# Patient Record
Sex: MALE | Race: WHITE | NOT HISPANIC OR LATINO | Employment: FULL TIME | URBAN - METROPOLITAN AREA
[De-identification: names, ages, dates, MRNs, and addresses within clinical notes are randomized per-mention and may not be internally consistent; named-entity substitution may affect disease eponyms.]

---

## 2017-01-27 ENCOUNTER — HOSPITAL ENCOUNTER (EMERGENCY)
Facility: CLINIC | Age: 47
Discharge: ANOTHER HOSPITAL | End: 2017-01-28
Attending: EMERGENCY MEDICINE | Admitting: EMERGENCY MEDICINE
Payer: COMMERCIAL

## 2017-01-27 ENCOUNTER — TRANSFERRED RECORDS (OUTPATIENT)
Dept: HEALTH INFORMATION MANAGEMENT | Facility: CLINIC | Age: 47
End: 2017-01-27

## 2017-01-27 DIAGNOSIS — I21.4 NSTEMI (NON-ST ELEVATED MYOCARDIAL INFARCTION) (H): ICD-10-CM

## 2017-01-27 LAB
BASOPHILS # BLD AUTO: 0 10E9/L (ref 0–0.2)
BASOPHILS NFR BLD AUTO: 0.3 %
DIFFERENTIAL METHOD BLD: ABNORMAL
EOSINOPHIL # BLD AUTO: 0.1 10E9/L (ref 0–0.7)
EOSINOPHIL NFR BLD AUTO: 0.6 %
ERYTHROCYTE [DISTWIDTH] IN BLOOD BY AUTOMATED COUNT: 13.2 % (ref 10–15)
HCT VFR BLD AUTO: 51.8 % (ref 40–53)
HGB BLD-MCNC: 17.2 G/DL (ref 13.3–17.7)
IMM GRANULOCYTES # BLD: 0 10E9/L (ref 0–0.4)
IMM GRANULOCYTES NFR BLD: 0.3 %
LYMPHOCYTES # BLD AUTO: 2.2 10E9/L (ref 0.8–5.3)
LYMPHOCYTES NFR BLD AUTO: 18.7 %
MCH RBC QN AUTO: 26.9 PG (ref 26.5–33)
MCHC RBC AUTO-ENTMCNC: 33.2 G/DL (ref 31.5–36.5)
MCV RBC AUTO: 81 FL (ref 78–100)
MONOCYTES # BLD AUTO: 1.3 10E9/L (ref 0–1.3)
MONOCYTES NFR BLD AUTO: 10.7 %
NEUTROPHILS # BLD AUTO: 8.3 10E9/L (ref 1.6–8.3)
NEUTROPHILS NFR BLD AUTO: 69.4 %
NRBC # BLD AUTO: 0 10*3/UL
NRBC BLD AUTO-RTO: 0 /100
PLATELET # BLD AUTO: 256 10E9/L (ref 150–450)
RBC # BLD AUTO: 6.39 10E12/L (ref 4.4–5.9)
WBC # BLD AUTO: 11.9 10E9/L (ref 4–11)

## 2017-01-27 PROCEDURE — 96365 THER/PROPH/DIAG IV INF INIT: CPT

## 2017-01-27 PROCEDURE — 96366 THER/PROPH/DIAG IV INF ADDON: CPT

## 2017-01-27 PROCEDURE — 84484 ASSAY OF TROPONIN QUANT: CPT | Performed by: EMERGENCY MEDICINE

## 2017-01-27 PROCEDURE — 99285 EMERGENCY DEPT VISIT HI MDM: CPT | Mod: 25

## 2017-01-27 PROCEDURE — 85025 COMPLETE CBC W/AUTO DIFF WBC: CPT | Performed by: EMERGENCY MEDICINE

## 2017-01-27 PROCEDURE — 96368 THER/DIAG CONCURRENT INF: CPT

## 2017-01-27 PROCEDURE — 93005 ELECTROCARDIOGRAM TRACING: CPT

## 2017-01-27 PROCEDURE — 25000128 H RX IP 250 OP 636: Performed by: EMERGENCY MEDICINE

## 2017-01-27 PROCEDURE — 96375 TX/PRO/DX INJ NEW DRUG ADDON: CPT

## 2017-01-27 PROCEDURE — 80048 BASIC METABOLIC PNL TOTAL CA: CPT | Performed by: EMERGENCY MEDICINE

## 2017-01-27 PROCEDURE — 25000128 H RX IP 250 OP 636

## 2017-01-27 RX ORDER — LIDOCAINE 40 MG/G
CREAM TOPICAL
Status: DISCONTINUED | OUTPATIENT
Start: 2017-01-27 | End: 2017-01-28 | Stop reason: HOSPADM

## 2017-01-27 RX ORDER — MORPHINE SULFATE 4 MG/ML
4 INJECTION, SOLUTION INTRAMUSCULAR; INTRAVENOUS
Status: DISCONTINUED | OUTPATIENT
Start: 2017-01-27 | End: 2017-01-28 | Stop reason: HOSPADM

## 2017-01-27 RX ORDER — NITROGLYCERIN 20 MG/100ML
.07-2 INJECTION INTRAVENOUS CONTINUOUS
Status: DISCONTINUED | OUTPATIENT
Start: 2017-01-27 | End: 2017-01-28 | Stop reason: HOSPADM

## 2017-01-27 RX ORDER — NITROGLYCERIN 20 MG/100ML
INJECTION INTRAVENOUS
Status: COMPLETED
Start: 2017-01-27 | End: 2017-01-27

## 2017-01-27 RX ADMIN — NITROGLYCERIN 0.07 MCG/KG/MIN: 20 INJECTION INTRAVENOUS at 23:45

## 2017-01-27 RX ADMIN — NITROGLYCERIN 0.17 MCG/KG/MIN: 20 INJECTION INTRAVENOUS at 23:50

## 2017-01-27 RX ADMIN — SODIUM CHLORIDE 1000 ML: 9 INJECTION, SOLUTION INTRAVENOUS at 23:45

## 2017-01-27 RX ADMIN — MORPHINE SULFATE 4 MG: 4 INJECTION, SOLUTION INTRAMUSCULAR; INTRAVENOUS at 23:49

## 2017-01-28 ENCOUNTER — APPOINTMENT (OUTPATIENT)
Dept: GENERAL RADIOLOGY | Facility: CLINIC | Age: 47
End: 2017-01-28
Attending: EMERGENCY MEDICINE
Payer: COMMERCIAL

## 2017-01-28 ENCOUNTER — TRANSFERRED RECORDS (OUTPATIENT)
Dept: HEALTH INFORMATION MANAGEMENT | Facility: CLINIC | Age: 47
End: 2017-01-28

## 2017-01-28 VITALS
DIASTOLIC BLOOD PRESSURE: 84 MMHG | SYSTOLIC BLOOD PRESSURE: 141 MMHG | HEIGHT: 72 IN | RESPIRATION RATE: 18 BRPM | BODY MASS INDEX: 30.16 KG/M2 | TEMPERATURE: 98.2 F | WEIGHT: 222.66 LBS | HEART RATE: 87 BPM | OXYGEN SATURATION: 95 %

## 2017-01-28 LAB
ANION GAP SERPL CALCULATED.3IONS-SCNC: 7 MMOL/L (ref 3–14)
BUN SERPL-MCNC: 15 MG/DL (ref 7–30)
CALCIUM SERPL-MCNC: 8.2 MG/DL (ref 8.5–10.1)
CHLORIDE SERPL-SCNC: 109 MMOL/L (ref 94–109)
CO2 SERPL-SCNC: 25 MMOL/L (ref 20–32)
CREAT SERPL-MCNC: 0.78 MG/DL (ref 0.66–1.25)
GFR SERPL CREATININE-BSD FRML MDRD: ABNORMAL ML/MIN/1.7M2
GLUCOSE SERPL-MCNC: 90 MG/DL (ref 70–99)
POTASSIUM SERPL-SCNC: 3.6 MMOL/L (ref 3.4–5.3)
SODIUM SERPL-SCNC: 141 MMOL/L (ref 133–144)
TROPONIN I SERPL-MCNC: 12.03 UG/L (ref 0–0.04)

## 2017-01-28 PROCEDURE — 25000128 H RX IP 250 OP 636: Performed by: EMERGENCY MEDICINE

## 2017-01-28 PROCEDURE — 71020 XR CHEST 2 VW: CPT

## 2017-01-28 RX ADMIN — Medication 5200 UNITS: at 00:36

## 2017-01-28 RX ADMIN — NITROGLYCERIN 0.27 MCG/KG/MIN: 20 INJECTION INTRAVENOUS at 00:21

## 2017-01-28 NOTE — ED NOTES
Chest pain started this am mid sternal and to the left this am pain 6/10   Now rates pain  4/10  Tightness   Denies sob, no n/v  Worked all day    Asa given and nitro  Given at clinic   Decided to go tonight d/t sister insisting

## 2017-01-28 NOTE — ED NOTES
Pt referred to ED from [facility] for chest pain and elevated troponin.  Chest pain has been present all day today.  Pt received ASA and Nitro at [facility].

## 2017-01-28 NOTE — ED PROVIDER NOTES
History     Chief Complaint:  Chest pain     HPI   Roberto Albright is a 46 year old male smoker with a past medical history of untreated hypertension and hyperlipidemia who presents for evaluation of chest pain. The patient states that he woke up this morning with a substernal, left-sided chest pain with no radiation to his arms or neck. He was seen at [facility] prior to arrival to the ED where he was given 1 nitroglycerin tablet and an aspirin upon finding the patient's troponin to be 6.42 (other lab results below). The patient states that his chest pain has improved significantly with the above interventions. He denies any previous history of chest pain.     [facility] Laboratory Studies:  CBC:  WBC 10.7 (H), HGB 17.2 (H),   CMP: Glucose 109 (H),  (H), otherwise WNL (Creatinine 0.9)    (2207) CK-MB: 80.1 (H)  (2207) Troponin I: 6.42 (H)    (2207) BNP: 84    (2207) D-Dimer: 101    Cardiac Risk Factors   Sex: Male   Tobacco: Positive  Hypertension: Positive  Diabetes: Negative  Hyperlipidemia: Positive  Family History: Negative    Allergies:  No known drug allergies.     Medications:    The patient is currently on no regular medications.     Past Medical History:    Hypertension  Hyperlipidemia    Past Surgical History:    Left inguinal hernia repair    Family History:    History reviewed. No pertinent family history.    Social History:  Marital Status:   Presents to the ED with his wife  Tobacco Use: yes  Alcohol Use: no  PCP: [facility]     Review of Systems   Eyes: Negative for visual disturbance.   Cardiovascular: Positive for chest pain (Improved).   All other systems reviewed and are negative.      Physical Exam     Patient Vitals for the past 24 hrs:   BP Temp Temp src Pulse Heart Rate Resp SpO2 Height Weight   01/28/17 0400 141/84 mmHg - - - 65 - 95 % - -   01/28/17 0345 121/80 mmHg - - - - - - - -   01/28/17 0330 125/80 mmHg - - - 63 -  94 % - -   01/28/17 0315 (!) 139/97 mmHg - - - 94 - 96 % - -   01/28/17 0300 (!) 145/94 mmHg - - - 68 - 94 % - -   01/28/17 0245 (!) 137/91 mmHg - - - 58 - 92 % - -   01/28/17 0230 131/88 mmHg - - - - - - - -   01/28/17 0215 (!) 136/92 mmHg - - - 67 - 95 % - -   01/28/17 0200 140/84 mmHg - - - 67 - 95 % - -   01/28/17 0130 134/89 mmHg - - - 61 - 96 % - -   01/28/17 0115 146/89 mmHg - - - - - - - -   01/28/17 0100 (!) 141/102 mmHg - - - 67 - 97 % - -   01/28/17 0045 137/88 mmHg - - - - - - - -   01/28/17 0030 (!) 142/101 mmHg - - - 71 - 95 % - -   01/28/17 0027 - - - - - - - - 101 kg (222 lb 10.6 oz)   01/27/17 2329 (!) 154/111 mmHg 98.2  F (36.8  C) Temporal 87 - 18 97 % 1.829 m (6') 101.4 kg (223 lb 8.7 oz)       Physical Exam   Nursing note and vitals reviewed.    Constitutional: Cooperative.   HENT:   Mouth/Throat: Mucous membranes are normal.   No JVD  Cardiovascular: Normal rate, regular rhythm and normal heart sounds.  No murmur.  Pulmonary/Chest: Effort normal and breath sounds normal. No respiratory distress. No wheezes. No rales.   Abdominal: Soft. Normal appearance and bowel sounds are normal. No distension. There is no tenderness. There is no rigidity and no guarding.   Musculoskeletal: No LE edema  Neurological: Alert. Oriented x4  Skin: Skin is warm and dry.   Psychiatric: Normal mood and affect.       Emergency Department Course   ECG:  @ 2324  Indication: Chest pain  Vent. Rate 69 bpm. AZ interval 170 ms. QRS duration 106 ms. QT/QTc 414/443 ms. P-R-T axis 52 -10 7.   Normal sinus rhythm with sinus arrhythmia. Possible inferior infarct, age undetermined. T wave abnormality, consider lateral ischemia.    Read @ 3740 by Dr. Sanchez.    Imaging:    XR Chest  Minimal prominence of interstitial markings in lower lungs may be due to some scattered fibrosis or linear atelectasis. No consolidative infiltrates. Heart size near the upper limits of normal. No pneumothorax or other acute findings.  Report per  radiology.    Radiographic findings were communicated with the patient who voiced understanding of the findings.    Laboratory:  CBC:  WBC 11.9 (L), HGB 17.2,   BMP: Calcium 8.2 (L), otherwise WNL (Creatinine 0.78)    (2335) Troponin I: 12.026 (H)    Interventions:  (2345) Normal Saline, 1 liter, IV bolus  (2349) Morphine, 4 mg, IV  (0021) Nitroglyccerin, 50 mg in D5W 250 mls, IV infusion  (0036) Heparin loading dose, 5,200 units, IV  (0036) Heparin infusion, 25,000 units in 0.45% NaCl 250 mls, IV    ASA given at outside clinic prior to arrival.     Emergency Department Course:  Nursing notes and vitals reviewed.  I performed an exam of the patient as documented above.     A peripheral IV was established. Blood was drawn from the patient. This was sent for laboratory testing, findings above.     The patient was sent for a chest x-ray while in the emergency department, findings above.     (2345) I consulted with Dr. Mcintyre of Cardiology regarding the patient.    (0015) I updated the patient on the plans for admission.    (0110)  Dr. Ramsey of the hospitalist service saw the patient in the ED. After discussion, she prefers to have the patient transferred instead of admitted here.     (0120) The patient's family requested that he be transferred to [facility].     Findings and plan explained to the patient. Patient will be transferred to [facility] via EMS.  (0208) I discussed the case with Dr. Pina, who will admit the patient to a monitored bed for further monitoring, evaluation, and treatment.      Impression & Plan      Medical Decision Making:  Roberto Albright is a 46 year old male who presents with chest pain. It has been present throughout most of the day. He had an elevated troponin at an outside hospital and was given an aspirin and nitro with improvement in his chest pain. Given he was still having pain when he arrived, he was placed on nitroglycerin. After confirming his mediastinum was normal with  no widening or concern clinically for dissection, I started him on heparin. EKG obtained here shows hyperacute T waves throughout he precordium, but no criteria for ST elevation myocardial infarction. I did confirm with cardiology that he is not a candidate for emergent cath lab activation. Will treat him medically. At this point, he is pain free and will be admitted to telemetry in stable condition.      Addendum: The patient was evaluated by our hospitalist, Dr. Ramsey. Given his almost certain need for catheterization tomorrow, she has requested that we proceed with transfer so that he is at a facility with capabilities of cath lab in case he would decompensate while being medically managed for his NSTEMI. There are no beds available at [facility] and at this time family has requested that he be transferred to [facility]. I have discussed the case with the hospitalist there and he'll be transferred for admission to the telemetry unit in stable condition.        Diagnosis:    ICD-10-CM   1. NSTEMI (non-ST elevated myocardial infarction) (H) I21.4       Disposition:  Transferred to [facility] under the care of Dr. Silvana FULTON, Ghulam Brewster, am serving as a scribe on 1/27/2017 at 11:50 PM to personally document services performed by Dr. Laura MD based on my observations and the provider's statements to me.     1/27/2017   [facility] EMERGENCY DEPARTMENT        Rogerio Sanchez MD  01/28/17 0453

## 2017-01-29 LAB — INTERPRETATION ECG - MUSE: NORMAL

## 2017-02-08 ENCOUNTER — TRANSFERRED RECORDS (OUTPATIENT)
Dept: HEALTH INFORMATION MANAGEMENT | Facility: CLINIC | Age: 47
End: 2017-02-08

## 2017-02-15 ENCOUNTER — HOSPITAL ENCOUNTER (OUTPATIENT)
Dept: CARDIAC REHAB | Facility: CLINIC | Age: 47
End: 2017-02-15
Payer: COMMERCIAL

## 2017-02-15 VITALS — HEIGHT: 72 IN | BODY MASS INDEX: 28.36 KG/M2 | WEIGHT: 209.4 LBS

## 2017-02-15 PROCEDURE — 93797 PHYS/QHP OP CAR RHAB WO ECG: CPT

## 2017-02-15 PROCEDURE — 40000116 ZZH STATISTIC OP CR VISIT

## 2017-02-15 PROCEDURE — 93798 PHYS/QHP OP CAR RHAB W/ECG: CPT

## 2017-02-15 PROCEDURE — 40000575 ZZH STATISTIC OP CARDIAC VISIT #2

## 2017-02-15 ASSESSMENT — 6 MINUTE WALK TEST (6MWT)
PREDICTED: 2200.84
TOTAL DISTANCE WALKED (FT): 1538
MALE CALC: 2187.5
FEMALE CALC: 1828.5

## 2017-02-15 NOTE — PROGRESS NOTES
Roberto Albright  1970  46 year old   02/15/17 0900   .I have established, reviewed and made necessary changes to the individualized treatment plan and exercise prescription for this patient.    Physician Name (printed): ________________________   Date: _______  Time: ______    Physician Signature: ___________________________________________    Session   Session Initial Evaluation and Exercise Prescription   Certified through this date 03/16/17   Cardiac Rehab Assessment   Cardiac Rehab Assessment 2/15 Pt presents for cardiac rehab initial evaluation s/p CABGx4 and NSTEMI. Pt has preserved heart function and has been feeling well post surgery. No issues with appetite or sternal clicking. He has been walking 2 miles (30-45 minutes) everyday since hospital discharge without symptoms. He is starting to wean off of pain medications. His surgery followup is scheduled 2-21-17 and he is hoping to get back to driving. He works as a  and most likely will not be returning to work until at least 3 months post surgery. Pt is motivated to keep up healthy lifestyle changes and will start cardiac rehab for monitored exercise, safe progression of exercise, and education surrounding medications and diet.   .The patient's history and clinical status including hemodynamics and ECG were evaluated.  The patient was assessed to be stable and appropriate to begin exercise.   The patient's functional capacity and exercise prescription were determined by the completion of the 6 minute walk test.  See results below.  The patient was oriented to the program.  Risk factor profile was completed. Goals and objectives were discussed. CV response was WNL. No symptoms, complaints or pain were reported. Good prognosis for reaching above goals. Skilled therapy is necessary in order to monitor CV response to exercise, to provide education on risk factors and behavior change counseling needed to achieve patient's goals.  Plan to complete  30-40 minutes of exercise prior to discharge from cardiac rehab.  Initial THR of 20-30 beats above RHR; Effort rating of 4-6.  Initiate muscle conditioning as appropriate and start surgical stretches next session.  Provide risk factor education and behavior change counseling.      General Information   Treatment Diagnosis Coronary Artery Bypass Surgery   Date of Treatment Diagnosis 01/30/17   Secondary Treatment Diagnosis NSTEMI   Significant Past CV History None   Comorbidities None   Other Medical History None noted   Lead up symptoms Chest pain awoke him in the night   Hospital Location [facility]   Hospital Discharge Date 02/03/17   Signs and Symptoms Post Hospital Discharge None   Outpatient Cardiac Rehab Start Date 02/15/17   Primary Physician Dr. Hernández   Primary Physician Follow Up Advised to schedule appointment   Surgeon Dr. Prince   Surgeon Follow Up Scheduled  (2/21/17)   Cardiologist see comments  ([facility], Pt unsure of name at this time)   Cardiologist Follow Up Other  (Pt has followup with surgeon next week)   Ejection Fraction Preserved   Risk Stratification Low   Summary of Cath Report   Summary of Cath Report Available   Date Performed 01/28/17   Left Main 30%   LAD 70%  (graft)   D1 50-95%  (graft)   LCX 50%  (graft)   OM 70%  (graft)   RCA 40-90%   PDA 70-90%   Living and Work Status    [facility]   Support System Live with an adult   Return to Employment Yes   Occupation    Preventative Medications   CMS recommended medications Lipid Lowering;Beta Blocker;Antiplatelets   Falls Screen   Have you fallen two or more times in the past year? No   Have you fallen and had an injury in the past year? No   Referral Initiated to Physical Therapy No   Pain   Patient Currently in Pain Denies   Physical Assessments   Incisions WNL   Edema None   Right Lung Sounds normal   Left Lung Sounds normal   Limitations No limitations   Individualized  Treatment Plan   Monitored Sessions Scheduled 24   Monitored Sessions Attended 1   Oxygen   Supplemental Oxygen needed No   Nutrition Management - Weight Management   Assessment Initial Assessment   Age 46   Weight 95 kg (209 lb 6.4 oz)   Height 1.829 m (6')   BMI (Calculated) 28.46   Goal Weight 90.7 kg (200 lb)   Initial Rate Your Plate Score. Dietary tool to assess eating patterns. Scores range from 24 to 72. The higher the score the healthier the eating pattern. 35   Weight Management Comments 2/15 Pt lost 18# while in the hospital. He states his goal weight is 200#. See nutrition section for details.    Nutrition Management - Lipids   Lipids Labs Not Available   Prescribed Lipid Medication Yes   Statin Intensity High Intensity   Lipid Comments 2/15 Pt is now taking atorvatstatin   Nutrition Management - Diabetes   Diabetes No   Nutrition Management Summary   Dietary Recommendations Mediterranean   Stages of Change for Diet Compliance Action   Interventions Planned Attend Nutrition Education Class(es);Instruct on Label Reading;Educate on Benefits of Exercise   Nutrition Summary Comments 2/15 Pt states he did not have a dietary consult while in the hospital so he would benefit from attending nutrition classes offerred through [facility] to learn more about label reading and heart healthy diet recommendations. He previously at out for all meals at the bar, gas stations, or other fast food places. Pt does not cook. He has already started making many diet changes and is eating more at home. Pt will benefit from learning about meal prep for when he returns to work as a .    Nutrition Target Outcome Weight loss .5-1 lb/week (if BMI > 25)   Psychosocial Management   Psychosocial Assessment Initial   Is there history of clinical depression or increased risk of depression? No previous history   Current Level of Stress per Patient Report Denies   Current Coping Skills Has Positive Support System   Initial Patient  Health Questionnaire -9 Score (PHQ-9) for depression. 5-9 Minimal symptoms, 10-14 Minor depression, 15-19 Major depression, moderately severe, > 20 Major depression, severe  2   Initial [facility] Survey score.  Quality of Life:   If total score > 25 review individual areas where patient rated a 4 or 5.  Consider patients current medical condition and what role that plays on the score.   Adjust treatment protocol to improve areas of concern.  Consider the following:  PHQ9 score, DASI, and re-assessment within the next 30 days to assist with developing treatments.  15   Stages of Change Maintenance   Interventions Planned Other (see comments)  (none needed at this time)   Psychosocial Comments 2/15 No risk for depression. Pt has positive support system and positive outlook.    Other Core Components - Hypertension   History of or Diagnosis of Hypertension No   Currently taking Anti-Hypertensives Yes   Other Core Components - Tobacco   History of Tobacco Use Yes   Quit Date or Planned Quit Date 01/27/17   Tobacco Use Status Recent (Quit < 6 mo ago)   Tobacco Habit Cigarettes   Tobacco Use per Day (average) 2ppd   Years of Tobacco Use 30   Stages of Change Action   Tobacco Comments 2/15 Pt is currently using nicoderm CQ patch for aid in smoking cessation. He states his incision reminds him everyday why he should be smoke free. He states he is 100% confident in his ability to abstain from smoking.    Other Core Components Summary   Interventions Planned Check-in regularly, offer support to prevent risk of relapse;Educate on signs/symptoms and when to call the doctor (i.e. increased edema, dyspnea, fatigue, dizziness/lightheadedness, change in sleep patterns, chest discomfort)   Activity/Exercise History   Activity/Exercise Assessment Initial   Activity/Exercise Status prior to event? Was Physically Active   Number of Days Currently participating in Moderate Physical Activity? 5-6   Number of Days Currently performing   Aerobic Exercise (including rehab)? 0   Number of Minutes per Session Currently of Aerobic Exercise (average)? 0   Current Stage of Change (Physical Activity) Maintenance   Current Stage of Change (Aerobic Exercise) Action   Patient Goals Goal #1   Goal #1 Description Pt will commit to doing aerobic exercise for at least 30 minutes 5-6 days per week as outlined in intensity by cardiac rehab program.    Goal #1 Target Date 04/15/17   Goal #1 Progress Towards Goal 2/15 Pt has started walking 30-45 minutes daily at a sports dome or outside since surgery.    Activity/Exercise Comments 2/15 See goal.    Activity/Exercise Target Outcome An Accumulation of 150  Minutes of Aerobic Activity per Week   Exercise Assessment   6 Minute Walk Predicted (Male) 2187.5   6 Minute Walk Predicted (Female) 1828.5   Initial 6 Minute Walk Distance (Feet) 1538 ft   Resting HR 58 bpm   Exercise HR 85 bpm   Post Exercise HR 62 bpm   Resting /70   Exercise /70   Post Exercise /68   Effort Rating 2   Current MET Level 3.2   MET Level Goal 6-6.5   ECG Rhythm Sinus bradycardia   Ectopy None   Current Symptoms Denies symptoms   Limitations/Restrictions Sternal Precautions   Exercise Prescription   Mode Treadmill;Recumbant bike   Duration/Time 30-45 min   Frequency 3 daysweek   THR (85% of age predicted max HR) 146.2   OMNI Effort Rating (0-10 Scale) 4-6/10   Progression Continuous bouts;Total exercise time of 30-45 minutes;Aerobic exercise to OMNI rating of 5-7, and heart rate at or below target;Progress peak intensity by 1/2 MET per week   Recommended Home Exercise   Type of Exercise Walking   Frequency (days per week) 2  (outside of rehab)   Duration (minutes per session) 30-45 min   Effort Rating Recommended 4-6/10   30 Day Exercise Plan 2/15 Pt will initiate cardiac rehab 3 days per week and continue walking at home 2 days per week at intensity rating of 4-6/10 on OMNI effort scale. Pt was introduced to OMNI effort scale  today.    Current Home Exercise   Type of Exercise Walking   Frequency (days per week) 5-6   Duration (minutes per session) 30-45   Follow-up/On-going Support   Provider follow-up needed on the following No follow-up needed   Learning Assessment   Learner Patient   Primary Language English   Preferred Learning Style Listening   Barriers to Learning No barriers noted   Patient Education   Education recommended Anatomy and Physiology of the Heart;Exercise Principles;Muscle Conditioning;Medication Overview;Nutrition;Blood Pressure

## 2017-02-17 ENCOUNTER — HOSPITAL ENCOUNTER (OUTPATIENT)
Dept: CARDIAC REHAB | Facility: CLINIC | Age: 47
End: 2017-02-17
Payer: COMMERCIAL

## 2017-02-17 PROCEDURE — 93798 PHYS/QHP OP CAR RHAB W/ECG: CPT

## 2017-02-17 PROCEDURE — 40000116 ZZH STATISTIC OP CR VISIT

## 2017-02-20 ENCOUNTER — HOSPITAL ENCOUNTER (OUTPATIENT)
Dept: CARDIAC REHAB | Facility: CLINIC | Age: 47
End: 2017-02-20
Payer: COMMERCIAL

## 2017-02-20 PROCEDURE — 40000116 ZZH STATISTIC OP CR VISIT: Performed by: REHABILITATION PRACTITIONER

## 2017-02-20 PROCEDURE — 93798 PHYS/QHP OP CAR RHAB W/ECG: CPT | Performed by: REHABILITATION PRACTITIONER

## 2017-02-22 ENCOUNTER — HOSPITAL ENCOUNTER (OUTPATIENT)
Dept: CARDIAC REHAB | Facility: CLINIC | Age: 47
End: 2017-02-22
Payer: COMMERCIAL

## 2017-02-22 VITALS — WEIGHT: 209.4 LBS | HEIGHT: 72 IN | BODY MASS INDEX: 28.36 KG/M2

## 2017-02-22 PROCEDURE — 40000116 ZZH STATISTIC OP CR VISIT: Performed by: REHABILITATION PRACTITIONER

## 2017-02-22 PROCEDURE — 93798 PHYS/QHP OP CAR RHAB W/ECG: CPT | Performed by: REHABILITATION PRACTITIONER

## 2017-02-22 ASSESSMENT — 6 MINUTE WALK TEST (6MWT)
MALE CALC: 2188.01
PREDICTED: 2201.35
TOTAL DISTANCE WALKED (FT): 1538
FEMALE CALC: 1828.64

## 2017-02-22 NOTE — PROGRESS NOTES
Roberto Albright 46 year old   02/22/17 1000   Session   Session 30 Day Individualized Treatment Plan   Certified through this date 03/31/17   Cardiac Rehab Assessment   Cardiac Rehab Assessment 2/15 Pt presents for cardiac rehab initial evaluation s/p CABGx4 and NSTEMI. Pt has preserved heart function and has been feeling well post surgery. No issues with appetite or sternal clicking. He has been walking 2 miles (30-45 minutes) everyday since hospital discharge without symptoms. He is starting to wean off of pain medications. His surgery followup is scheduled 2-21-17 and he is hoping to get back to driving. He works as a  and most likely will not be returning to work until at least 3 months post surgery. Pt is motivated to keep up healthy lifestyle changes and will start cardiac rehab for monitored exercise, safe progression of exercise, and education surrounding medications and diet. 2/22/2017 Progress update/ITP done today. PT has started a home exercise program. He is starting to make changes in his diet. Sleep is still an issue as he sleeps only 3-4 hours before waking up. Previously he slept only a max of 5 hours at a time. PT is planning to return to work in after 8 weeks or longer. He is still using some pain medication as needed. Today he started light weights. He continues to not smoke and is very confident that he will remain a non-smoker. Due to many prior risk factors, PT will need ongoing support/counseling to manage his risk factors successfully and build confidence to maintain the changes he has made.   General Information   Treatment Diagnosis Coronary Artery Bypass Surgery   Date of Treatment Diagnosis 01/30/17   Secondary Treatment Diagnosis NSTEMI   Significant Past CV History None   Comorbidities None   Other Medical History None noted   Lead up symptoms Chest pain awoke him in the night   Hospital Location [facility]   Hospital Discharge Date 02/03/17   Signs and Symptoms Post Hospital  "Discharge None   Outpatient Cardiac Rehab Start Date 02/15/17   Primary Physician Dr. Hernández   Primary Physician Follow Up Advised to schedule appointment   Surgeon Dr. Prince   Surgeon Follow Up Scheduled  (2/21/17)   Cardiologist see comments  ([facility], Pt unsure of name at this time)   Cardiologist Follow Up Other  (Pt has followup with surgeon next week)   Ejection Fraction Preserved   Risk Stratification Low   Summary of Cath Report   Summary of Cath Report Available   Date Performed 01/28/17   Left Main 30%   LAD 70%  (graft)   D1 50-95%  (graft)   LCX 50%  (graft)   OM 70%  (graft)   RCA 40-90%   PDA 70-90%   Living and Work Status    Living Arrangements and Social Status [employer] Live with an adult   Return to Employment Yes   Occupation    Preventative Medications   CMS recommended medications Lipid Lowering;Beta Blocker;Antiplatelets   Falls Screen   Have you fallen two or more times in the past year? No   Have you fallen and had an injury in the past year? No   Referral Initiated to Physical Therapy No   Pain   Patient Currently in Pain Denies   Physical Assessments   Incisions WNL   Edema None   Right Lung Sounds not assessed   Left Lung Sounds not assessed   Limitations Surgical   Individualized Treatment Plan   Monitored Sessions Scheduled 24   Monitored Sessions Attended 4   Oxygen   Supplemental Oxygen needed No   Nutrition Management - Weight Management   Assessment Re-assessment   Age 46   Weight 95 kg (209 lb 6.4 oz)   Height 1.829 m (6' 0.01\")   BMI (Calculated) 28.45   Goal Weight 90.7 kg (200 lb)   Initial Rate Your Plate Score. Dietary tool to assess eating patterns. Scores range from 24 to 72. The higher the score the healthier the eating pattern. 35   Weight Management Comments 2/15 Pt lost 18# while in the hospital. He states his goal weight is 200#. See nutrition section for details. 2/21/2017 PT is maintaining his weight.   Nutrition " Management - Lipids   Lipids Labs Not Available   Prescribed Lipid Medication Yes   Statin Intensity High Intensity   Lipid Comments 2/15 Pt is now taking atorvatstatin   Nutrition Management - Diabetes   Diabetes No   Nutrition Management Summary   Dietary Recommendations Mediterranean   Stages of Change for Diet Compliance Action   Interventions Planned Attend Nutrition Education Class(es);Instruct on Label Reading;Educate on Benefits of Exercise   Nutrition Summary Comments 2/15 Pt states he did not have a dietary consult while in the hospital so he would benefit from attending nutrition classes offerred through [facility] to learn more about label reading and heart healthy diet recommendations. He previously at out for all meals at the bar, gas stations, or other fast food places. Pt does not cook. He has already started making many diet changes and is eating more at home. Pt will benefit from learning about meal prep for when he returns to work as a . 2/22/2017 PT has maintain the weight he has lost and is not concerned about losing more weight at this time. Weight loss may be difficult as PT has recently quit smoking. He is trying to make healthy food choices which is his goal now. Encouraged him to attend the nutrition classes to learn more about the heart healthy diet. He is not certain if he will attend or not. He is choosing leaner protein and more fruits/vegetables. He is thinking that when he returns to work he will pack healthier food in his cooler: water, fresh fruit and raw vegetables such as celery and carrots.    Nutrition Target Outcome Weight loss .5-1 lb/week (if BMI > 25)   Psychosocial Management   Psychosocial Assessment Re-assessment   Is there history of clinical depression or increased risk of depression? No previous history   Current Level of Stress per Patient Report Denies   Current Coping Skills Has Positive Support System   Initial Patient Health Questionnaire -9 Score (PHQ-9) for  depression. 5-9 Minimal symptoms, 10-14 Minor depression, 15-19 Major depression, moderately severe, > 20 Major depression, severe  2   Initial [facility] score.  Quality of Life:   If total score > 25 review individual areas where patient rated a 4 or 5.  Consider patients current medical condition and what role that plays on the score.   Adjust treatment protocol to improve areas of concern.  Consider the following:  PHQ9 score, DASI, and re-assessment within the next 30 days to assist with developing treatments.  15   Stages of Change Maintenance   Interventions Planned Other (see comments)  (none needed at this time)   Psychosocial Comments 2/15 No risk for depression. Pt has positive support system and positive outlook. 2/22/2017 PT does not feel he has any stress at this time.    Other Core Components - Hypertension   History of or Diagnosis of Hypertension No   Currently taking Anti-Hypertensives Yes   Hypertension Comments 2/22/2017 Blood pressure has been well-controlled.   Other Core Components - Tobacco   History of Tobacco Use Yes   Quit Date or Planned Quit Date 01/27/17   Tobacco Use Status Recent (Quit < 6 mo ago)   Tobacco Habit Cigarettes   Tobacco Use per Day (average) 2ppd   Years of Tobacco Use 30   Stages of Change Action   Tobacco Comments 2/15 Pt is currently using nicoderm CQ patch for aid in smoking cessation. He states his incision reminds him everyday why he should be smoke free. He states he is 100% confident in his ability to abstain from smoking. 2/22/2017 PT continues to not smoke and is 9.5/10 for confidence that he will not smoke again. He has quit in the past for up to 3 years and then returned to smoking. He is using the nicotine patch some days now. He works with smokers, but feels the 3 months that he is out of work will help him to remain a non-smoker.   Other Core Components Summary   Interventions Planned Check-in regularly, offer support to prevent risk of  relapse;Educate on signs/symptoms and when to call the doctor (i.e. increased edema, dyspnea, fatigue, dizziness/lightheadedness, change in sleep patterns, chest discomfort)   Interventions In Progress or Completed Checked-in regularly, offered support to prevent risk of relapse   Other Core Components Comments 2/22/2017 See section under tobacco   Activity/Exercise History   Activity/Exercise Assessment Re-assessment   Activity/Exercise Status prior to event? Was Physically Active   Number of Days Currently participating in Moderate Physical Activity? 5-6   Number of Days Currently performing  Aerobic Exercise (including rehab)? 4-5   Number of Minutes per Session Currently of Aerobic Exercise (average)? 35-55   Current Stage of Change (Physical Activity) Maintenance   Current Stage of Change (Aerobic Exercise) Action   Patient Goals Goal #1   Goal #1 Description Pt will commit to doing aerobic exercise for at least 30 minutes 5-6 days per week as outlined in intensity by cardiac rehab program.    Goal #1 Target Date 04/15/17   Goal #1 Progress Towards Goal 2/15 Pt has started walking 30-45 minutes daily at a sports dome or outside since surgery. 2/22/2016 PT is walking 3.2 miles 1-2 days outside of cardiac rehab. Encouraged him to continue with this.   Activity/Exercise Comments 2/15 See goal.    Activity/Exercise Target Outcome An Accumulation of 150  Minutes of Aerobic Activity per Week   Exercise Assessment   6 Minute Walk Predicted (Male) 2188.01   6 Minute Walk Predicted (Female) 1828.64   Initial 6 Minute Walk Distance (Feet) 1538 ft   Resting HR 63 bpm   Exercise HR 90 bpm   Post Exercise HR 72 bpm   Resting /76   Exercise /74   Post Exercise BP 84/60   Effort Rating 5   Current MET Level 3.8   MET Level Goal 6-6.5   ECG Rhythm Sinus rhythm   Ectopy None   Current Symptoms Denies symptoms   Limitations/Restrictions Sternal Precautions   Exercise Prescription   Mode Treadmill;Recumbant  bike;Weights   Duration/Time 30-45 min   Frequency 3 daysweek   THR (85% of age predicted max HR) 146.2   OMNI Effort Rating (0-10 Scale) 4-6/10   Progression Continuous bouts;Total exercise time of 30-45 minutes;Aerobic exercise to OMNI rating of 5-7, and heart rate at or below target;Progress peak intensity by 1/2 MET per week   Comments 2/22/2017 PT shown stretching exercises and started 3# weights today.   Recommended Home Exercise   Type of Exercise Walking   Frequency (days per week) 1-2  (outside of rehab)   Duration (minutes per session) 45-60 min aerobic exercise   Effort Rating Recommended 4-6/10   30 Day Exercise Plan 2/15 Pt will initiate cardiac rehab 3 days per week and continue walking at home 2 days per week at intensity rating of 4-6/10 on OMNI effort scale. Pt was introduced to OMNI effort scale today. 2/22/2017 PT is walking 3.2 miles 1-2 days outside of rehab. Encouraged him to continue with this.   Current Home Exercise   Type of Exercise Walking   Frequency (days per week) 1-2   Duration (minutes per session) 55   Follow-up/On-going Support   Provider follow-up needed on the following No follow-up needed   Learning Assessment   Learner Patient   Primary Language English   Preferred Learning Style Listening   Barriers to Learning No barriers noted   Patient Education   Education recommended Anatomy and Physiology of the Heart;Exercise Principles;Muscle Conditioning;Medication Overview;Nutrition;Blood Pressure   Physician cosignature/electronic signature indicates approval of this ITP document. I have established, reviewed and made necessary changes to the individualized treatment plan and exercise prescription for this patient.

## 2017-02-24 ENCOUNTER — HOSPITAL ENCOUNTER (OUTPATIENT)
Dept: CARDIAC REHAB | Facility: CLINIC | Age: 47
End: 2017-02-24
Payer: COMMERCIAL

## 2017-02-24 PROCEDURE — 40000116 ZZH STATISTIC OP CR VISIT

## 2017-02-24 PROCEDURE — 93798 PHYS/QHP OP CAR RHAB W/ECG: CPT

## 2017-02-27 ENCOUNTER — HOSPITAL ENCOUNTER (OUTPATIENT)
Dept: CARDIAC REHAB | Facility: CLINIC | Age: 47
End: 2017-02-27
Payer: COMMERCIAL

## 2017-02-27 PROCEDURE — 40000116 ZZH STATISTIC OP CR VISIT

## 2017-02-27 PROCEDURE — 93798 PHYS/QHP OP CAR RHAB W/ECG: CPT

## 2017-03-01 ENCOUNTER — HOSPITAL ENCOUNTER (OUTPATIENT)
Dept: CARDIAC REHAB | Facility: CLINIC | Age: 47
End: 2017-03-01
Payer: COMMERCIAL

## 2017-03-01 PROCEDURE — 40000116 ZZH STATISTIC OP CR VISIT

## 2017-03-01 PROCEDURE — 93798 PHYS/QHP OP CAR RHAB W/ECG: CPT

## 2017-03-03 ENCOUNTER — HOSPITAL ENCOUNTER (OUTPATIENT)
Dept: CARDIAC REHAB | Facility: CLINIC | Age: 47
End: 2017-03-03
Payer: COMMERCIAL

## 2017-03-03 PROCEDURE — 40000116 ZZH STATISTIC OP CR VISIT: Performed by: OCCUPATIONAL THERAPIST

## 2017-03-03 PROCEDURE — 93798 PHYS/QHP OP CAR RHAB W/ECG: CPT | Performed by: OCCUPATIONAL THERAPIST

## 2017-03-06 ENCOUNTER — HOSPITAL ENCOUNTER (OUTPATIENT)
Dept: CARDIAC REHAB | Facility: CLINIC | Age: 47
End: 2017-03-06
Payer: COMMERCIAL

## 2017-03-06 PROCEDURE — 40000116 ZZH STATISTIC OP CR VISIT: Performed by: OCCUPATIONAL THERAPIST

## 2017-03-06 PROCEDURE — 93798 PHYS/QHP OP CAR RHAB W/ECG: CPT | Performed by: OCCUPATIONAL THERAPIST

## 2017-03-08 ENCOUNTER — HOSPITAL ENCOUNTER (OUTPATIENT)
Dept: CARDIAC REHAB | Facility: CLINIC | Age: 47
End: 2017-03-08
Payer: COMMERCIAL

## 2017-03-08 PROCEDURE — 40000116 ZZH STATISTIC OP CR VISIT: Performed by: OCCUPATIONAL THERAPIST

## 2017-03-08 PROCEDURE — 93798 PHYS/QHP OP CAR RHAB W/ECG: CPT | Performed by: OCCUPATIONAL THERAPIST

## 2017-03-10 ENCOUNTER — HOSPITAL ENCOUNTER (OUTPATIENT)
Dept: CARDIAC REHAB | Facility: CLINIC | Age: 47
End: 2017-03-10
Payer: COMMERCIAL

## 2017-03-10 PROCEDURE — 40000116 ZZH STATISTIC OP CR VISIT: Performed by: REHABILITATION PRACTITIONER

## 2017-03-10 PROCEDURE — 93798 PHYS/QHP OP CAR RHAB W/ECG: CPT | Performed by: REHABILITATION PRACTITIONER

## 2017-03-13 ENCOUNTER — HOSPITAL ENCOUNTER (OUTPATIENT)
Dept: CARDIAC REHAB | Facility: CLINIC | Age: 47
End: 2017-03-13
Payer: COMMERCIAL

## 2017-03-13 VITALS — HEIGHT: 72 IN | BODY MASS INDEX: 29.39 KG/M2 | WEIGHT: 217 LBS

## 2017-03-13 PROCEDURE — 93798 PHYS/QHP OP CAR RHAB W/ECG: CPT | Performed by: OCCUPATIONAL THERAPIST

## 2017-03-13 PROCEDURE — 40000116 ZZH STATISTIC OP CR VISIT: Performed by: OCCUPATIONAL THERAPIST

## 2017-03-13 ASSESSMENT — 6 MINUTE WALK TEST (6MWT)
TOTAL DISTANCE WALKED (FT): 1538
FEMALE CALC: 1802.7
PREDICTED: 2181.28
MALE CALC: 2168.06

## 2017-03-13 NOTE — PROGRESS NOTES
Roberto Albright 47 year old  03/13/17 1000   Session   Session 60 Day Individualized Treatment Plan   Certified through this date 04/22/17   Cardiac Rehab Assessment   Cardiac Rehab Assessment 2/15 Pt presents for cardiac rehab initial evaluation s/p CABGx4 and NSTEMI. Pt has preserved heart function and has been feeling well post surgery. No issues with appetite or sternal clicking. He has been walking 2 miles (30-45 minutes) everyday since hospital discharge without symptoms. He is starting to wean off of pain medications. His surgery followup is scheduled 2-21-17 and he is hoping to get back to driving. He works as a  and most likely will not be returning to work until at least 3 months post surgery. Pt is motivated to keep up healthy lifestyle changes and will start cardiac rehab for monitored exercise, safe progression of exercise, and education surrounding medications and diet. 2/22/2017 Progress update/ITP done today. PT has started a home exercise program. He is starting to make changes in his diet. Sleep is still an issue as he sleeps only 3-4 hours before waking up. Previously he slept only a max of 5 hours at a time. PT is planning to return to work in after 8 weeks or longer. He is still using some pain medication as needed. Today he started light weights. He continues to not smoke and is very confident that he will remain a non-smoker. Due to many prior risk factors, PT will need ongoing support/counseling to manage his risk factors successfully and build confidence to maintain the changes he has made. 3/13/2017 PT continues to progress with the current levels of exercise and has reached 5.1 METs. He has started weights:5-8# and still has some chest soreness. He is no longer on pain meds except for occasionally an excedrin or Tylenol. PT continues to be succesful with not smoking. He is gaining weight as he is eating more and would like to learn more about heart healthy eating.  He is planning  to return to work in about 4 weeks. PT is still not sleeping well, but better than before. He would continue to benefit from skilled therapy for counseling/education with the heart healthy diet, support with maintaining smoking cessation and a graded, monitored exercise program to return PT safely to work.   General Information   Treatment Diagnosis Coronary Artery Bypass Surgery   Date of Treatment Diagnosis 01/30/17   Secondary Treatment Diagnosis NSTEMI   Significant Past CV History None   Comorbidities None   Other Medical History None noted   Lead up symptoms Chest pain awoke him in the night   Hospital Location [facility]   Hospital Discharge Date 02/03/17   Signs and Symptoms Post Hospital Discharge None   Outpatient Cardiac Rehab Start Date 02/15/17   Primary Physician Dr. Hernández   Primary Physician Follow Up Advised to schedule appointment   Surgeon Dr. Prince   Surgeon Follow Up Scheduled  (2/21/17)   Cardiologist see comments  ([facility], Pt unsure of name at this time)   Cardiologist Follow Up Other  (Pt has followup with surgeon next week)   Ejection Fraction Preserved   Risk Stratification Low   Summary of Cath Report   Summary of Cath Report Available   Date Performed 01/28/17   Left Main 30%   LAD 70%  (graft)   D1 50-95%  (graft)   LCX 50%  (graft)   OM 70%  (graft)   RCA 40-90%   PDA 70-90%   Living and Work Status    Living Arrangements and Social Status [facility]   Support System Live with an adult   Return to Employment Yes   Occupation    Preventative Medications   CMS recommended medications Lipid Lowering;Beta Blocker;Antiplatelets   Falls Screen   Have you fallen two or more times in the past year? No   Have you fallen and had an injury in the past year? No   Referral Initiated to Physical Therapy No   Pain   Patient Currently in Pain Denies   Physical Assessments   Incisions WNL   Edema None   Right Lung Sounds not assessed   Left Lung Sounds not assessed  "  Limitations Surgical   Individualized Treatment Plan   Monitored Sessions Scheduled 24   Monitored Sessions Attended 12   Oxygen   Supplemental Oxygen needed No   Nutrition Management - Weight Management   Assessment Re-assessment   Age 46   Weight 98.4 kg (217 lb)   Height 1.829 m (6' 0.01\")   BMI (Calculated) 29.49   Goal Weight 90.7 kg (200 lb)   Initial Rate Your Plate Score. Dietary tool to assess eating patterns. Scores range from 24 to 72. The higher the score the healthier the eating pattern. 35   Weight Management Comments 2/15 Pt lost 18# while in the hospital. He states his goal weight is 200#. See nutrition section for details. 2/21/2017 PT is maintaining his weight. 3/13/2017 HIs weight is increasing. He admits he may be eating more since he is not smoking.    Nutrition Management - Lipids   Lipids Labs Not Available   Prescribed Lipid Medication Yes   Statin Intensity High Intensity   Lipid Comments 2/15 Pt is now taking atorvastatin   Nutrition Management - Diabetes   Diabetes No   Nutrition Management Summary   Dietary Recommendations Mediterranean   Stages of Change for Diet Compliance Action   Interventions Planned Attend Nutrition Education Class(es);Instruct on Label Reading;Educate on Benefits of Exercise   Nutrition Summary Comments 2/15 Pt states he did not have a dietary consult while in the hospital so he would benefit from attending nutrition classes offerred through [facility] to learn more about label reading and heart healthy diet recommendations. He previously at out for all meals at the bar, gas stations, or other fast food places. Pt does not cook. He has already started making many diet changes and is eating more at home. Pt will benefit from learning about meal prep for when he returns to work as a . 2/22/2017 PT has maintain the weight he has lost and is not concerned about losing more weight at this time. Weight loss may be difficult as PT has recently quit smoking. He is " trying to make healthy food choices which is his goal now. Encouraged him to attend the nutrition classes to learn more about the heart healthy diet. He is not certain if he will attend or not. He is choosing leaner protein and more fruits/vegetables. He is thinking that when he returns to work he will pack healthier food in his cooler: water, fresh fruit and raw vegetables such as celery and carrots. 3/13/2017 His weight is increasing and he admits that he is eating more since he is not smoking. PT is now thinking about attending the nutrition classes. Given education schedule.   Nutrition Target Outcome Weight loss .5-1 lb/week (if BMI > 25)   Psychosocial Management   Psychosocial Assessment Re-assessment   Is there history of clinical depression or increased risk of depression? No previous history   Current Level of Stress per Patient Report Denies   Current Coping Skills Has Positive Support System   Initial Patient Health Questionnaire -9 Score (PHQ-9) for depression. 5-9 Minimal symptoms, 10-14 Minor depression, 15-19 Major depression, moderately severe, > 20 Major depression, severe  2   Initial [facility] Survey score.  Quality of Life:   If total score > 25 review individual areas where patient rated a 4 or 5.  Consider patients current medical condition and what role that plays on the score.   Adjust treatment protocol to improve areas of concern.  Consider the following:  PHQ9 score, DASI, and re-assessment within the next 30 days to assist with developing treatments.  15   Stages of Change Maintenance   Interventions Planned Patient denies need for intervention at this time.  (none needed at this time)   Psychosocial Comments 2/15 No risk for depression. Pt has positive support system and positive outlook. 2/22/2017 PT does not feel he has any stress at this time. 3/13/2017 PT continues to deny any stress in his life. He is being paid while on leave from his job. Plan is to return to work in 4  weeks.   Other Core Components - Hypertension   History of or Diagnosis of Hypertension No   Currently taking Anti-Hypertensives Yes   Hypertension Comments 2/22/2017 Blood pressure has been well-controlled. 3/31/2017 No change.   Other Core Components - Tobacco   History of Tobacco Use Yes   Quit Date or Planned Quit Date 01/27/17   Tobacco Use Status Recent (Quit < 6 mo ago)   Tobacco Habit Cigarettes   Tobacco Use per Day (average) 2ppd   Years of Tobacco Use 30   Stages of Change Action   Tobacco Comments 2/15 Pt is currently using nicoderm CQ patch for aid in smoking cessation. He states his incision reminds him everyday why he should be smoke free. He states he is 100% confident in his ability to abstain from smoking. 2/22/2017 PT continues to not smoke and is 9.5/10 for confidence that he will not smoke again. He has quit in the past for up to 3 years and then returned to smoking. He is using the nicotine patch some days now. He works with smokers, but feels the 3 months that he is out of work will help him to remain a non-smoker. 3/31/2017 PT continues to be successful with not smoking. He is no longer using the nicotine patch. Cravings have not been an issue, but he admits he is eating more since he is not smoking.    Other Core Components Summary   Interventions Planned Check-in regularly, offer support to prevent risk of relapse   Interventions In Progress or Completed Checked-in regularly, offered support to prevent risk of relapse   Other Core Components Comments 2/22/2017 See section under tobacco. 3/13/2017 PT is not smoking and doing well with this so far.   Activity/Exercise History   Activity/Exercise Assessment Re-assessment   Activity/Exercise Status prior to event? Was Physically Active   Number of Days Currently participating in Moderate Physical Activity? 5-6   Number of Days Currently performing  Aerobic Exercise (including rehab)? 4-5   Number of Minutes per Session Currently of Aerobic  Exercise (average)? 20-60   Current Stage of Change (Physical Activity) Maintenance   Current Stage of Change (Aerobic Exercise) Action   Patient Goals Goal #1   Goal #1 Description Pt will commit to doing aerobic exercise for at least 30 minutes 5-6 days per week as outlined in intensity by cardiac rehab program.    Goal #1 Target Date 04/15/17   Goal #1 Progress Towards Goal 2/15 Pt has started walking 30-45 minutes daily at a sports dome or outside since surgery. 2/22/2016 PT is walking 3.2 miles 1-2 days outside of cardiac rehab. Encouraged him to continue with this. 3/13/2017 PT is walking 1-2 days per week outside of rehab. He walks for one hour outdoors and 20 minutes on the TM.   Activity/Exercise Comments 2/15 See goal.    Activity/Exercise Target Outcome An Accumulation of 150  Minutes of Aerobic Activity per Week   Exercise Assessment   6 Minute Walk Predicted (Male) 2168.06   6 Minute Walk Predicted (Female) 1802.7   Initial 6 Minute Walk Distance (Feet) 1538 ft   Resting HR 67 bpm   Exercise  bpm   Post Exercise HR 79 bpm   Resting /76   Exercise /78   Post Exercise /62   Effort Rating 5   Current MET Level 5.1   MET Level Goal 6-6.5   ECG Rhythm Sinus rhythm   Ectopy PVCs   Current Symptoms Incisional pain   Limitations/Restrictions Sternal Precautions   Exercise Prescription   Mode Treadmill;Recumbant bike;Weights   Duration/Time 30-45 min   Frequency 3 daysweek   THR (85% of age predicted max HR) 146.2   OMNI Effort Rating (0-10 Scale) 4-6/10   Progression Continuous bouts;Total exercise time of 30-45 minutes;Aerobic exercise to OMNI rating of 5-7, and heart rate at or below target;Progress peak intensity by 1/2 MET per week   Comments 2/22/2017 PT shown stretching exercises and started 3# weights today.   Recommended Home Exercise   Type of Exercise Walking   Frequency (days per week) 1-2  (outside of rehab)   Duration (minutes per session) 30-45 min;45-60 min aerobic  exercise   Effort Rating Recommended 4-6/10   30 Day Exercise Plan 2/15 Pt will initiate cardiac rehab 3 days per week and continue walking at home 2 days per week at intensity rating of 4-6/10 on OMNI effort scale. Pt was introduced to OMNI effort scale today. 2/22/2017 PT is walking 3.2 miles 1-2 days outside of rehab. Encouraged him to continue with this.   Current Home Exercise   Type of Exercise Walking   Frequency (days per week) 1-2   Duration (minutes per session) 20-60   Follow-up/On-going Support   Provider follow-up needed on the following No follow-up needed   Learning Assessment   Learner Patient   Primary Language English   Preferred Learning Style Listening   Barriers to Learning No barriers noted   Patient Education   Education recommended Anatomy and Physiology of the Heart;Exercise Principles;Muscle Conditioning;Medication Overview;Nutrition;Blood Pressure   Education Comments 3/13/2017 Given education calendar and encouraged him to attend several classes.   Physician cosignature/electronic signature indicates approval of this ITP document. I have established, reviewed and made necessary changes to the individualized treatment plan and exercise prescription for this patient.

## 2017-03-15 ENCOUNTER — HOSPITAL ENCOUNTER (OUTPATIENT)
Dept: CARDIAC REHAB | Facility: CLINIC | Age: 47
End: 2017-03-15
Payer: COMMERCIAL

## 2017-03-15 PROCEDURE — 93798 PHYS/QHP OP CAR RHAB W/ECG: CPT | Performed by: OCCUPATIONAL THERAPIST

## 2017-03-15 PROCEDURE — 40000116 ZZH STATISTIC OP CR VISIT: Performed by: OCCUPATIONAL THERAPIST

## 2017-03-22 ENCOUNTER — HOSPITAL ENCOUNTER (OUTPATIENT)
Dept: CARDIAC REHAB | Facility: CLINIC | Age: 47
End: 2017-03-22
Payer: COMMERCIAL

## 2017-03-22 PROCEDURE — 40000116 ZZH STATISTIC OP CR VISIT: Performed by: OCCUPATIONAL THERAPIST

## 2017-03-22 PROCEDURE — 93798 PHYS/QHP OP CAR RHAB W/ECG: CPT | Performed by: OCCUPATIONAL THERAPIST

## 2017-03-24 ENCOUNTER — HOSPITAL ENCOUNTER (OUTPATIENT)
Dept: CARDIAC REHAB | Facility: CLINIC | Age: 47
End: 2017-03-24
Payer: COMMERCIAL

## 2017-03-24 PROCEDURE — 93798 PHYS/QHP OP CAR RHAB W/ECG: CPT | Performed by: REHABILITATION PRACTITIONER

## 2017-03-24 PROCEDURE — 40000116 ZZH STATISTIC OP CR VISIT: Performed by: REHABILITATION PRACTITIONER

## 2017-03-27 ENCOUNTER — HOSPITAL ENCOUNTER (OUTPATIENT)
Dept: CARDIAC REHAB | Facility: CLINIC | Age: 47
End: 2017-03-27
Payer: COMMERCIAL

## 2017-03-27 PROCEDURE — 93798 PHYS/QHP OP CAR RHAB W/ECG: CPT

## 2017-03-27 PROCEDURE — 40000116 ZZH STATISTIC OP CR VISIT

## 2017-03-29 ENCOUNTER — HOSPITAL ENCOUNTER (OUTPATIENT)
Dept: CARDIAC REHAB | Facility: CLINIC | Age: 47
End: 2017-03-29
Payer: COMMERCIAL

## 2017-03-29 VITALS — WEIGHT: 223.4 LBS | HEIGHT: 72 IN | BODY MASS INDEX: 30.26 KG/M2

## 2017-03-29 PROCEDURE — 40000116 ZZH STATISTIC OP CR VISIT: Performed by: REHABILITATION PRACTITIONER

## 2017-03-29 PROCEDURE — 93798 PHYS/QHP OP CAR RHAB W/ECG: CPT | Performed by: REHABILITATION PRACTITIONER

## 2017-03-29 ASSESSMENT — 6 MINUTE WALK TEST (6MWT)
MALE CALC: 2151.27
FEMALE CALC: 1780.85
TOTAL DISTANCE WALKED (FT): 1538
PREDICTED: 2164.39

## 2017-03-29 NOTE — PROGRESS NOTES
03/29/17 1000   Session  Roberto Albright  1970  [facility]    Physician cosignature/electronic signature indicates approval of this ITP document. I have established, reviewed and made necessary changes to the individualized treatment plan and exercise prescription for this patient.       Session 90 Day Individualized Treatment Plan   Certified through this date 05/13/17   Cardiac Rehab Assessment   Cardiac Rehab Assessment 2/15 Pt presents for cardiac rehab initial evaluation s/p CABGx4 and NSTEMI. Pt has preserved heart function and has been feeling well post surgery. No issues with appetite or sternal clicking. He has been walking 2 miles (30-45 minutes) everyday since hospital discharge without symptoms. He is starting to wean off of pain medications. His surgery followup is scheduled 2-21-17 and he is hoping to get back to driving. He works as a  and most likely will not be returning to work until at least 3 months post surgery. Pt is motivated to keep up healthy lifestyle changes and will start cardiac rehab for monitored exercise, safe progression of exercise, and education surrounding medications and diet. 2/22/2017 Progress update/ITP done today. PT has started a home exercise program. He is starting to make changes in his diet. Sleep is still an issue as he sleeps only 3-4 hours before waking up. Previously he slept only a max of 5 hours at a time. PT is planning to return to work in after 8 weeks or longer. He is still using some pain medication as needed. Today he started light weights. He continues to not smoke and is very confident that he will remain a non-smoker. Due to many prior risk factors, PT will need ongoing support/counseling to manage his risk factors successfully and build confidence to maintain the changes he has made. 3/13/2017 PT continues to progress with the current levels of exercise and has reached 5.1 METs. He has started weights:5-8# and still has some chest soreness. He  is no longer on pain meds except for occasionally an excedrin or Tylenol. PT continues to be succesful with not smoking. He is gaining weight as he is eating more and would like to learn more about heart healthy eating.  He is planning to return to work in about 4 weeks. PT is still not sleeping well, but better than before.3/29/27Progress update done today. He continues to progress with rehab. He will see primary MD next week. He still is not sleeping well, and will discuss with MD. He is not sure when he will go back to work. He would continue to benefit from skilled therapy for counseling/education with the heart healthy diet, support with maintaining smoking cessation and a graded, monitored exercise program to return PT safely to work.   General Information   Treatment Diagnosis Coronary Artery Bypass Surgery   Date of Treatment Diagnosis 01/30/17   Secondary Treatment Diagnosis NSTEMI   Significant Past CV History None   Comorbidities None   Other Medical History None noted   Lead up symptoms Chest pain awoke him in the night   Hospital Location [facility]   Hospital Discharge Date 02/03/17   Signs and Symptoms Post Hospital Discharge Sleep   Outpatient Cardiac Rehab Start Date 02/15/17   Primary Physician Dr. Hernández   Primary Physician Follow Up Advised to schedule appointment   Surgeon Dr. Prince   Surgeon Follow Up Scheduled  (2/21/17)   Cardiologist see comments  ([facility], Pt unsure of name at this time)   Cardiologist Follow Up Other  (Pt has followup with surgeon next week)   Ejection Fraction Preserved   Risk Stratification Low   Summary of Cath Report   Summary of Cath Report Available   Date Performed 01/28/17   Left Main 30%   LAD 70%  (graft)   D1 50-95%  (graft)   LCX 50%  (graft)   OM 70%  (graft)   RCA 40-90%   PDA 70-90%   Living and Work Status    [facility]   Support System Live with an adult  (lives with his sister)   Return to Employment  "Yes   Occupation    Preventative Medications   CMS recommended medications Lipid Lowering;Beta Blocker;Antiplatelets   Falls Screen   Have you fallen two or more times in the past year? No   Have you fallen and had an injury in the past year? No   Referral Initiated to Physical Therapy No   Pain   Patient Currently in Pain Denies   Physical Assessments   Incisions WNL   Edema None   Right Lung Sounds not assessed   Left Lung Sounds not assessed   Limitations Surgical   Individualized Treatment Plan   Monitored Sessions Scheduled 24   Monitored Sessions Attended 17   Oxygen   Supplemental Oxygen needed No   Nutrition Management - Weight Management   Assessment Re-assessment   Age 46   Weight 101.3 kg (223 lb 6.4 oz)   Height 1.829 m (6' 0.01\")   BMI (Calculated) 30.36   Goal Weight 90.7 kg (200 lb)   Initial Rate Your Plate Score. Dietary tool to assess eating patterns. Scores range from 24 to 72. The higher the score the healthier the eating pattern. 35   Weight Management Comments 2/15 Pt lost 18# while in the hospital. He states his goal weight is 200#. See nutrition section for details. 2/21/2017 PT is maintaining his weight. 3/13/2017 HIs weight is increasing. He admits he may be eating more since he is not smoking.    Nutrition Management - Lipids   Lipids Labs Not Available   Prescribed Lipid Medication Yes   Statin Intensity High Intensity   Lipid Comments 2/15 Pt is now taking atorvastatin   Nutrition Management - Diabetes   Diabetes No   Nutrition Management Summary   Dietary Recommendations Mediterranean   Stages of Change for Diet Compliance Action   Interventions Planned Attend Nutrition Education Class(es);Instruct on Label Reading;Educate on Benefits of Exercise   Interventions In Progress or Completed Attended Nutrition Class(es)   Nutrition Summary Comments 2/15 Pt states he did not have a dietary consult while in the hospital so he would benefit from attending nutrition classes offerred " through [facility] to learn more about label reading and heart healthy diet recommendations. He previously at out for all meals at the bar, gas stations, or other fast food places. Pt does not cook. He has already started making many diet changes and is eating more at home. Pt will benefit from learning about meal prep for when he returns to work as a . 2/22/2017 PT has maintain the weight he has lost and is not concerned about losing more weight at this time. Weight loss may be difficult as PT has recently quit smoking. He is trying to make healthy food choices which is his goal now. Encouraged him to attend the nutrition classes to learn more about the heart healthy diet. He is not certain if he will attend or not. He is choosing leaner protein and more fruits/vegetables. He is thinking that when he returns to work he will pack healthier food in his cooler: water, fresh fruit and raw vegetables such as celery and carrots. 3/13/2017 His weight is increasing and he admits that he is eating more since he is not smoking. PT is now thinking about attending the nutrition classes. Given education schedule.3/29/17 PT has attended class 1, and plans to attend nutrition class 2. He has questions on dining out.    Nutrition Target Outcome Weight loss .5-1 lb/week (if BMI > 25)   Psychosocial Management   Psychosocial Assessment Re-assessment   Is there history of clinical depression or increased risk of depression? No previous history   Current Level of Stress per Patient Report Denies   Current Coping Skills Has Positive Support System   Initial Patient Health Questionnaire -9 Score (PHQ-9) for depression. 5-9 Minimal symptoms, 10-14 Minor depression, 15-19 Major depression, moderately severe, > 20 Major depression, severe  2   Initial [facility] Survey score.  Quality of Life:   If total score > 25 review individual areas where patient rated a 4 or 5.  Consider patients current medical condition and what role that  plays on the score.   Adjust treatment protocol to improve areas of concern.  Consider the following:  PHQ9 score, DASI, and re-assessment within the next 30 days to assist with developing treatments.  15   Stages of Change Maintenance   Interventions Planned Patient denies need for intervention at this time.  (none needed at this time)   Psychosocial Comments 2/15 No risk for depression. Pt has positive support system and positive outlook. 2/22/2017 PT does not feel he has any stress at this time. 3/13/2017 PT continues to deny any stress in his life. He is being paid while on leave from his job. Plan is to return to work in 4 weeks.   Other Core Components - Hypertension   History of or Diagnosis of Hypertension No   Currently taking Anti-Hypertensives Yes   Hypertension Comments 2/22/2017 Blood pressure has been well-controlled. 3/31/2017 No change.   Other Core Components - Tobacco   History of Tobacco Use Yes   Quit Date or Planned Quit Date 01/27/17   Tobacco Use Status Recent (Quit < 6 mo ago)   Tobacco Habit Cigarettes   Tobacco Use per Day (average) 2ppd   Years of Tobacco Use 30   Stages of Change Action   Tobacco Comments 2/15 Pt is currently using nicoderm CQ patch for aid in smoking cessation. He states his incision reminds him everyday why he should be smoke free. He states he is 100% confident in his ability to abstain from smoking. 2/22/2017 PT continues to not smoke and is 9.5/10 for confidence that he will not smoke again. He has quit in the past for up to 3 years and then returned to smoking. He is using the nicotine patch some days now. He works with smokers, but feels the 3 months that he is out of work will help him to remain a non-smoker. 3/31/2017 PT continues to be successful with not smoking. He is no longer using the nicotine patch. Cravings have not been an issue, but he admits he is eating more since he is not smoking. 3/29/17 PT continues to be smoke free, and this is going well. He  girlfriend has quit smoking, and his sister is no longer smoking in the house.    Other Core Components Summary   Interventions Planned Check-in regularly, offer support to prevent risk of relapse   Interventions In Progress or Completed Checked-in regularly, offered support to prevent risk of relapse   Other Core Components Comments 2/22/2017 See section under tobacco. 3/13/2017 PT is not smoking and doing well with this so far.   Activity/Exercise History   Activity/Exercise Assessment Re-assessment   Activity/Exercise Status prior to event? Was Physically Active   Number of Days Currently participating in Moderate Physical Activity? 5-6   Number of Days Currently performing  Aerobic Exercise (including rehab)? 4-5   Number of Minutes per Session Currently of Aerobic Exercise (average)? 20-60   Current Stage of Change (Physical Activity) Maintenance   Current Stage of Change (Aerobic Exercise) Action   Patient Goals Goal #1   Goal #1 Description Pt will commit to doing aerobic exercise for at least 30 minutes 5-6 days per week as outlined in intensity by cardiac rehab program.    Goal #1 Target Date 04/15/17   Goal #1 Date Met 03/29/17   Goal #1 Progress Towards Goal 2/15 Pt has started walking 30-45 minutes daily at a sports dome or outside since surgery. 2/22/2016 PT is walking 3.2 miles 1-2 days outside of cardiac rehab. Encouraged him to continue with this. 3/13/2017 PT is walking 1-2 days per week outside of rehab. He walks for one hour outdoors and 20 minutes on the TM. 3/29/17 Goal met. PT is exercising on days off from rehab as stated. He has a home TM and plans to join a gym once rehab is completed.    Activity/Exercise Comments 2/15 See goal.    Activity/Exercise Target Outcome An Accumulation of 150  Minutes of Aerobic Activity per Week   Exercise Assessment   6 Minute Walk Predicted (Male) 2151.27   6 Minute Walk Predicted (Female) 1780.85   Initial 6 Minute Walk Distance (Feet) 1538 ft   Resting HR 65  bpm   Exercise  bpm   Post Exercise HR 80 bpm   Resting /70   Exercise /64   Post Exercise /62   Effort Rating 5   Current MET Level 5.5   MET Level Goal 6-6.5   ECG Rhythm Sinus rhythm   Ectopy PVCs   Current Symptoms Incisional pain   Limitations/Restrictions Sternal Precautions   Exercise Prescription   Mode Treadmill;Recumbant bike;Weights   Duration/Time 30-45 min   Frequency 3 daysweek   THR (85% of age predicted max HR) 146.2   OMNI Effort Rating (0-10 Scale) 4-6/10   Progression Continuous bouts;Total exercise time of 30-45 minutes;Aerobic exercise to OMNI rating of 5-7, and heart rate at or below target;Progress peak intensity by 1/2 MET per week   Comments 2/22/2017 PT shown stretching exercises and started 3# weights today.   Recommended Home Exercise   Type of Exercise Walking   Frequency (days per week) 3  (outside of rehab)   Duration (minutes per session) 30-45 min;45-60 min aerobic exercise   Effort Rating Recommended 4-6/10   30 Day Exercise Plan 2/15 Pt will initiate cardiac rehab 3 days per week and continue walking at home 2 days per week at intensity rating of 4-6/10 on OMNI effort scale. Pt was introduced to OMNI effort scale today. 2/22/2017 PT is walking 3.2 miles 1-2 days outside of rehab. Encouraged him to continue with this.   Current Home Exercise   Type of Exercise Walking   Frequency (days per week) 2 to 3   Duration (minutes per session) 20-60   Follow-up/On-going Support   Provider follow-up needed on the following No follow-up needed   Learning Assessment   Learner Patient   Primary Language English   Preferred Learning Style Listening   Barriers to Learning No barriers noted   Patient Education   Education recommended Anatomy and Physiology of the Heart;Exercise Principles;Muscle Conditioning;Medication Overview;Nutrition;Blood Pressure   Education classes attended Nutrition   Education Comments 3/13/2017 Given education calendar and encouraged him to attend  several classes.

## 2017-03-31 ENCOUNTER — HOSPITAL ENCOUNTER (OUTPATIENT)
Dept: CARDIAC REHAB | Facility: CLINIC | Age: 47
End: 2017-03-31
Payer: COMMERCIAL

## 2017-03-31 PROCEDURE — 40000116 ZZH STATISTIC OP CR VISIT: Performed by: REHABILITATION PRACTITIONER

## 2017-03-31 PROCEDURE — 93798 PHYS/QHP OP CAR RHAB W/ECG: CPT | Performed by: REHABILITATION PRACTITIONER

## 2017-04-04 ENCOUNTER — HOSPITAL ENCOUNTER (OUTPATIENT)
Dept: CARDIAC REHAB | Facility: CLINIC | Age: 47
End: 2017-04-04
Payer: COMMERCIAL

## 2017-04-04 PROCEDURE — 93798 PHYS/QHP OP CAR RHAB W/ECG: CPT | Performed by: REHABILITATION PRACTITIONER

## 2017-04-04 PROCEDURE — 40000116 ZZH STATISTIC OP CR VISIT: Performed by: REHABILITATION PRACTITIONER

## 2017-04-06 ENCOUNTER — HOSPITAL ENCOUNTER (OUTPATIENT)
Dept: CARDIAC REHAB | Facility: CLINIC | Age: 47
End: 2017-04-06
Payer: COMMERCIAL

## 2017-04-06 PROCEDURE — 93798 PHYS/QHP OP CAR RHAB W/ECG: CPT | Performed by: OCCUPATIONAL THERAPIST

## 2017-04-06 PROCEDURE — 40000116 ZZH STATISTIC OP CR VISIT: Performed by: OCCUPATIONAL THERAPIST

## 2017-04-13 ENCOUNTER — HOSPITAL ENCOUNTER (OUTPATIENT)
Dept: CARDIAC REHAB | Facility: CLINIC | Age: 47
End: 2017-04-13
Payer: COMMERCIAL

## 2017-04-13 PROCEDURE — 93798 PHYS/QHP OP CAR RHAB W/ECG: CPT

## 2017-04-13 PROCEDURE — 40000116 ZZH STATISTIC OP CR VISIT

## 2017-04-17 ENCOUNTER — HOSPITAL ENCOUNTER (OUTPATIENT)
Dept: CARDIAC REHAB | Facility: CLINIC | Age: 47
End: 2017-04-17
Payer: COMMERCIAL

## 2017-04-17 PROCEDURE — 40000116 ZZH STATISTIC OP CR VISIT: Performed by: REHABILITATION PRACTITIONER

## 2017-04-17 PROCEDURE — 93798 PHYS/QHP OP CAR RHAB W/ECG: CPT | Performed by: REHABILITATION PRACTITIONER

## 2017-04-19 ENCOUNTER — HOSPITAL ENCOUNTER (OUTPATIENT)
Dept: CARDIAC REHAB | Facility: CLINIC | Age: 47
End: 2017-04-19
Payer: COMMERCIAL

## 2017-04-19 PROCEDURE — 93798 PHYS/QHP OP CAR RHAB W/ECG: CPT | Performed by: OCCUPATIONAL THERAPIST

## 2017-04-19 PROCEDURE — 40000116 ZZH STATISTIC OP CR VISIT: Performed by: OCCUPATIONAL THERAPIST

## 2017-04-26 ENCOUNTER — HOSPITAL ENCOUNTER (OUTPATIENT)
Dept: CARDIAC REHAB | Facility: CLINIC | Age: 47
End: 2017-04-26
Payer: COMMERCIAL

## 2017-04-26 VITALS — HEIGHT: 72 IN | WEIGHT: 229 LBS | BODY MASS INDEX: 31.02 KG/M2

## 2017-04-26 PROCEDURE — 40000116 ZZH STATISTIC OP CR VISIT

## 2017-04-26 PROCEDURE — 93798 PHYS/QHP OP CAR RHAB W/ECG: CPT

## 2017-04-26 ASSESSMENT — 6 MINUTE WALK TEST (6MWT)
GENDER SELECTION: MALE
TOTAL DISTANCE WALKED (FT): 1538
FEMALE CALC: 1761.59
MALE CALC: 2136.07
TOTAL DISTANCE WALKED (FT): 1831
PREDICTED: 2149.1

## 2017-04-26 NOTE — PROGRESS NOTES
Roberto Albright  1970  47 year old  CABG 04/26/17 1500   Session   Session Discharge Note   Certified through this date 05/13/17   Cardiac Rehab Assessment   Cardiac Rehab Assessment 2/15 Pt presents for cardiac rehab initial evaluation s/p CABGx4 and NSTEMI. Pt has preserved heart function and has been feeling well post surgery. No issues with appetite or sternal clicking. He has been walking 2 miles (30-45 minutes) everyday since hospital discharge without symptoms. He is starting to wean off of pain medications. His surgery followup is scheduled 2-21-17 and he is hoping to get back to driving. He works as a  and most likely will not be returning to work until at least 3 months post surgery. Pt is motivated to keep up healthy lifestyle changes and will start cardiac rehab for monitored exercise, safe progression of exercise, and education surrounding medications and diet. 2/22/2017 Progress update/ITP done today. PT has started a home exercise program. He is starting to make changes in his diet. Sleep is still an issue as he sleeps only 3-4 hours before waking up. Previously he slept only a max of 5 hours at a time. PT is planning to return to work in after 8 weeks or longer. He is still using some pain medication as needed. Today he started light weights. He continues to not smoke and is very confident that he will remain a non-smoker. Due to many prior risk factors, PT will need ongoing support/counseling to manage his risk factors successfully and build confidence to maintain the changes he has made. 3/13/2017 PT continues to progress with the current levels of exercise and has reached 5.1 METs. He has started weights:5-8# and still has some chest soreness. He is no longer on pain meds except for occasionally an excedrin or Tylenol. PT continues to be succesful with not smoking. He is gaining weight as he is eating more and would like to learn more about heart healthy eating.  He is planning to  return to work in about 4 weeks. PT is still not sleeping well, but better than before.3/29/27Progress update done today. He continues to progress with rehab. He will see primary MD next week. He still is not sleeping well, and will discuss with MD. He is not sure when he will go back to work. He would continue to benefit from skilled therapy for counseling/education with the heart healthy diet, support with maintaining smoking cessation and a graded, monitored exercise program to return PT safely to work.   General Information   Treatment Diagnosis Coronary Artery Bypass Surgery   Date of Treatment Diagnosis 01/30/17   Secondary Treatment Diagnosis NSTEMI   Significant Past CV History None   Comorbidities None   Other Medical History None noted   Lead up symptoms Chest pain awoke him in the night   Hospital Location [facility]   Hospital Discharge Date 02/03/17   Signs and Symptoms Post Hospital Discharge Sleep   Outpatient Cardiac Rehab Start Date 02/15/17   Primary Physician Dr. Hernández   Primary Physician Follow Up Completed   Surgeon Dr. Prince   Surgeon Follow Up Completed   Cardiologist see comments  ([facility], Pt unsure of name at this time)   Cardiologist Follow Up Completed   Ejection Fraction Preserved   Risk Stratification Low   Summary of Cath Report   Summary of Cath Report Available   Date Performed 01/28/17   Left Main 30%   LAD 70%  (graft)   D1 50-95%  (graft)   LCX 50%  (graft)   OM 70%  (graft)   RCA 40-90%   PDA 70-90%   Living and Work Status    [facility] Ar[facility]ements and [facility]   Support System Live with an adult  (lives with his sister)   Return to Employment Yes   Occupation    Preventative Medications   CMS recommended medications Lipid Lowering;Beta Blocker;Antiplatelets   Falls Screen   Have you fallen two or more times in the past year? No   Have you fallen and had an injury in the past year? No   Referral Initiated to Physical Therapy No   Pain    Patient Currently in Pain Denies   Physical Assessments   Incisions WNL   Edema None   Right Lung Sounds not assessed   Left Lung Sounds not assessed   Limitations Surgical   Individualized Treatment Plan   Monitored Sessions Scheduled 24   Monitored Sessions Attended 24   Oxygen   Supplemental Oxygen needed No   Nutrition Management - Weight Management   Assessment Re-assessment   Age 46   Weight 103.9 kg (229 lb)   Height 1.829 m (6')   BMI (Calculated) 31.12   Goal Weight 90.7 kg (200 lb)   Initial Rate Your Plate Score. Dietary tool to assess eating patterns. Scores range from 24 to 72. The higher the score the healthier the eating pattern. 35   Discharge Rate Your Plate Score 47   Weight Management Comments 2/15 Pt lost 18# while in the hospital. He states his goal weight is 200#. See nutrition section for details. 2/21/2017 PT is maintaining his weight. 3/13/2017 HIs weight is increasing. He admits he may be eating more since he is not smoking. 4/26 No change   Nutrition Management - Lipids   Lipids Labs Not Available   Prescribed Lipid Medication Yes   Statin Intensity High Intensity   Lipid Comments 2/15 Pt is now taking atorvastatin   Nutrition Management - Diabetes   Diabetes No   Nutrition Management Summary   Dietary Recommendations Mediterranean   Stages of Change for Diet Compliance Action   Interventions Planned Attend Nutrition Education Class(es);Instruct on Label Reading;Educate on Benefits of Exercise   Interventions In Progress or Completed Attended Nutrition Class(es);Understands how to accurately read Food Labels;Understands Weight Management Principles;Educated on Benefits of Exercise   Nutrition Summary Comments 2/15 Pt states he did not have a dietary consult while in the hospital so he would benefit from attending nutrition classes offerred through [facility] to learn more about label reading and heart healthy diet recommendations. He previously at out for all meals at the bar, gas stations, or  other fast food places. Pt does not cook. He has already started making many diet changes and is eating more at home. Pt will benefit from learning about meal prep for when he returns to work as a . 2/22/2017 PT has maintain the weight he has lost and is not concerned about losing more weight at this time. Weight loss may be difficult as PT has recently quit smoking. He is trying to make healthy food choices which is his goal now. Encouraged him to attend the nutrition classes to learn more about the heart healthy diet. He is not certain if he will attend or not. He is choosing leaner protein and more fruits/vegetables. He is thinking that when he returns to work he will pack healthier food in his cooler: water, fresh fruit and raw vegetables such as celery and carrots. 3/13/2017 His weight is increasing and he admits that he is eating more since he is not smoking. PT is now thinking about attending the nutrition classes. Given education schedule.3/29/17 PT has attended class 1, and plans to attend nutrition class 2. He has questions on dining out. 4/26 Pt did not get the opportunity to attend nutrition 2 but he does feel he knows what he needs to do to be successful with heart healthy eating. Went through BMI with Pt and goal long term for weight once he is further from his smoking quit date.    Nutrition Target Outcome Weight loss .5-1 lb/week (if BMI > 25)   Psychosocial Management   Psychosocial Assessment Re-assessment   Is there history of clinical depression or increased risk of depression? No previous history   Current Level of Stress per Patient Report Denies   Current Coping Skills Has Positive Support System   Initial Patient Health Questionnaire -9 Score (PHQ-9) for depression. 5-9 Minimal symptoms, 10-14 Minor depression, 15-19 Major depression, moderately severe, > 20 Major depression, severe  2   Discharge PHQ-9 Score for Depression 3   Initial [facility] Survey score.  Quality of  Life:   If total score > 25 review individual areas where patient rated a 4 or 5.  Consider patients current medical condition and what role that plays on the score.   Adjust treatment protocol to improve areas of concern.  Consider the following:  PHQ9 score, DASI, and re-assessment within the next 30 days to assist with developing treatments.  15   Discharge [facility] Survey Score 10   Stages of Change Maintenance   Interventions Planned Patient denies need for intervention at this time.  (none needed at this time)   Interventions In Progress or Completed Patient verbalizes understanding of behavioral assessment scores;Patient verbalizes understanding of negative impact of stress to personal health   Psychosocial Comments 2/15 No risk for depression. Pt has positive support system and positive outlook. 2/22/2017 PT does not feel he has any stress at this time. 3/13/2017 PT continues to deny any stress in his life. He is being paid while on leave from his job. Plan is to return to work in 4 weeks.   Other Core Components - Hypertension   History of or Diagnosis of Hypertension No   Currently taking Anti-Hypertensives Yes   Hypertension Comments 2/22/2017 Blood pressure has been well-controlled. 3/31/2017 No change.   Other Core Components - Tobacco   History of Tobacco Use Yes   Quit Date or Planned Quit Date 01/27/17   Tobacco Use Status Recent (Quit < 6 mo ago)   Tobacco Habit Cigarettes   Tobacco Use per Day (average) 2ppd   Years of Tobacco Use 30   Stages of Change Action   Tobacco Comments 2/15 Pt is currently using nicoderm CQ patch for aid in smoking cessation. He states his incision reminds him everyday why he should be smoke free. He states he is 100% confident in his ability to abstain from smoking. 2/22/2017 PT continues to not smoke and is 9.5/10 for confidence that he will not smoke again. He has quit in the past for up to 3 years and then returned to smoking. He is using the nicotine patch some  days now. He works with smokers, but feels the 3 months that he is out of work will help him to remain a non-smoker. 3/31/2017 PT continues to be successful with not smoking. He is no longer using the nicotine patch. Cravings have not been an issue, but he admits he is eating more since he is not smoking. 3/29/17 PT continues to be smoke free, and this is going well. He girlfriend has quit smoking, and his sister is no longer smoking in the house.  4/26 No change. Pt continues to abstain from smoking.   Other Core Components Summary   Interventions Planned Check-in regularly, offer support to prevent risk of relapse   Interventions In Progress or Completed Checked-in regularly, offered support to prevent risk of relapse;Educated on importance of maintaining low sodium diet   Other Core Components Comments 2/22/2017 See section under tobacco. 3/13/2017 PT is not smoking and doing well with this so far.   Activity/Exercise History   Activity/Exercise Assessment Re-assessment   Activity/Exercise Status prior to event? Was Physically Active   Number of Days Currently participating in Moderate Physical Activity? 5-6   Number of Days Currently performing  Aerobic Exercise (including rehab)? 4-5   Number of Minutes per Session Currently of Aerobic Exercise (average)? 20-60   Current Stage of Change (Physical Activity) Maintenance   Current Stage of Change (Aerobic Exercise) Action   Patient Goals Goal #1   Goal #1 Description Pt will commit to doing aerobic exercise for at least 30 minutes 5-6 days per week as outlined in intensity by cardiac rehab program.    Goal #1 Target Date 04/15/17   Goal #1 Date Met 03/29/17   Goal #1 Progress Towards Goal 2/15 Pt has started walking 30-45 minutes daily at a sports dome or outside since surgery. 2/22/2016 PT is walking 3.2 miles 1-2 days outside of cardiac rehab. Encouraged him to continue with this. 3/13/2017 PT is walking 1-2 days per week outside of rehab. He walks for one hour  outdoors and 20 minutes on the TM. 3/29/17 Goal met. PT is exercising on days off from rehab as stated. He has a home TM and plans to join a gym once rehab is completed.    Activity/Exercise Comments 2/15 See goal.    Activity/Exercise Target Outcome An Accumulation of 150  Minutes of Aerobic Activity per Week   Exercise Assessment   6 Minute Walk Predicted - Gender Selection Male   6 Minute Walk Predicted (Male) 2136.07   6 Minute Walk Predicted (Female) 1761.59   Initial 6 Minute Walk Distance (Feet) 1538 ft   Discharge 6 Minute Walk Distance (Feet) 1831   Resting HR 65 bpm   Exercise  bpm   Post Exercise HR 75 bpm   Resting /62   Exercise /68   Post Exercise /68   Effort Rating 4   Current MET Level 5.5   MET Level Goal 6-6.5   ECG Rhythm Sinus rhythm   Ectopy PVCs   Current Symptoms Denies symptoms   Limitations/Restrictions None   Exercise Prescription   Mode Treadmill;Recumbant bike;Weights   Duration/Time 30-45 min   Frequency 3 daysweek   THR (85% of age predicted max HR) 146.2   OMNI Effort Rating (0-10 Scale) 4-6/10   Progression Continuous bouts;Total exercise time of 30-45 minutes;Aerobic exercise to OMNI rating of 5-7, and heart rate at or below target;Progress peak intensity by 1/2 MET per week   Comments 2/22/2017 PT shown stretching exercises and started 3# weights today.   Recommended Home Exercise   Type of Exercise Walking   Frequency (days per week) 3  (outside of rehab)   Duration (minutes per session) 30-45 min;45-60 min aerobic exercise   Effort Rating Recommended 4-6/10   30 Day Exercise Plan 2/15 Pt will initiate cardiac rehab 3 days per week and continue walking at home 2 days per week at intensity rating of 4-6/10 on OMNI effort scale. Pt was introduced to OMNI effort scale today. 2/22/2017 PT is walking 3.2 miles 1-2 days outside of rehab. Encouraged him to continue with this. 4/26 Pt will discharge from cardiac rehab and continue with at home exercise following  guidelines given to him in rehab and accumulating at least 150 min of aerobic exercise per week.   Current Home Exercise   Type of Exercise Walking   Frequency (days per week) 2 to 3   Duration (minutes per session) 20-60   Follow-up/On-going Support   Provider follow-up needed on the following No follow-up needed   Learning Assessment   Learner Patient   Primary Language English   Preferred Learning Style Listening   Barriers to Learning No barriers noted   Patient Education   Education recommended Anatomy and Physiology of the Heart;Exercise Principles;Muscle Conditioning;Medication Overview;Nutrition;Blood Pressure   Education classes attended Nutrition   Education Comments 3/13/2017 Given education calendar and encouraged him to attend several classes.   Pt made significant gains in exercise tolerance. Initially patient tolerated 25 minutes at 3.3METs, now tolerating 35 minutes at 5.5 METs. Patient also increased 6-minute walk test by an increase of 293 feet.) The PT was given instructions on frequency, intensity, and duration for continued exercise as well as muscle conditioning and stretching exercises.  Your PT plans to continue to exercise at home and get a gym membership to achieve 150 min of aerobic exercise per week minimum. He plans to continue weight training and stretching as recommended. He will be returning to work full time on Monday.

## 2019-12-20 ENCOUNTER — TRANSFERRED RECORDS (OUTPATIENT)
Dept: HEALTH INFORMATION MANAGEMENT | Facility: CLINIC | Age: 49
End: 2019-12-20

## 2020-12-04 ENCOUNTER — TRANSFERRED RECORDS (OUTPATIENT)
Dept: HEALTH INFORMATION MANAGEMENT | Facility: CLINIC | Age: 50
End: 2020-12-04

## 2020-12-22 ENCOUNTER — TRANSFERRED RECORDS (OUTPATIENT)
Dept: HEALTH INFORMATION MANAGEMENT | Facility: CLINIC | Age: 50
End: 2020-12-22

## 2020-12-31 ENCOUNTER — TRANSFERRED RECORDS (OUTPATIENT)
Dept: HEALTH INFORMATION MANAGEMENT | Facility: CLINIC | Age: 50
End: 2020-12-31

## 2020-12-31 LAB
CREAT SERPL-MCNC: 1 MG/DL (ref 0.73–1.18)
GFR SERPL CREATININE-BSD FRML MDRD: >60 ML/MIN/1.73M2
GLUCOSE SERPL-MCNC: 116 MG/DL (ref 70–100)
POTASSIUM SERPL-SCNC: 3.9 MMOL/L (ref 3.5–5.1)

## 2021-01-11 ENCOUNTER — TRANSFERRED RECORDS (OUTPATIENT)
Dept: HEALTH INFORMATION MANAGEMENT | Facility: CLINIC | Age: 51
End: 2021-01-11

## 2021-01-11 LAB — EJECTION FRACTION: 50 %

## 2021-02-08 ENCOUNTER — TRANSCRIBE ORDERS (OUTPATIENT)
Dept: OTHER | Age: 51
End: 2021-02-08

## 2021-02-08 DIAGNOSIS — I25.10 CAD, MULTIPLE VESSEL: Primary | ICD-10-CM

## 2022-12-01 ENCOUNTER — TRANSFERRED RECORDS (OUTPATIENT)
Dept: HEALTH INFORMATION MANAGEMENT | Facility: CLINIC | Age: 52
End: 2022-12-01

## 2022-12-02 ENCOUNTER — TELEPHONE (OUTPATIENT)
Dept: UROLOGY | Facility: CLINIC | Age: 52
End: 2022-12-02

## 2022-12-02 ENCOUNTER — TRANSCRIBE ORDERS (OUTPATIENT)
Dept: OTHER | Age: 52
End: 2022-12-02

## 2022-12-02 DIAGNOSIS — N20.1 LEFT URETERAL STONE: Primary | ICD-10-CM

## 2022-12-02 NOTE — TELEPHONE ENCOUNTER
[facility] Call Center    Phone Message    May a detailed message be left on voicemail: yes     Reason for Call: Appointment Intake    Referring Provider Name: Zase Ossipee  Diagnosis and/or Symptoms: left urethral stone- asap- offered pt vv 12/14 with [facility] but declined. Only wanting [facility].    Action Taken: Message routed to:  Other: ub uro    Travel Screening: Not Applicable

## 2022-12-05 ENCOUNTER — MEDICAL CORRESPONDENCE (OUTPATIENT)
Dept: HEALTH INFORMATION MANAGEMENT | Facility: CLINIC | Age: 52
End: 2022-12-05

## 2023-01-02 RX ORDER — ATORVASTATIN CALCIUM 80 MG/1
80 TABLET, FILM COATED ORAL DAILY
COMMUNITY

## 2023-01-02 RX ORDER — CLOPIDOGREL BISULFATE 75 MG/1
75 TABLET ORAL DAILY
Status: ON HOLD | COMMUNITY
End: 2023-02-20

## 2023-01-02 RX ORDER — METOPROLOL TARTRATE 50 MG
75 TABLET ORAL EVERY MORNING
COMMUNITY

## 2023-01-02 RX ORDER — ACETAMINOPHEN 500 MG
500-1000 TABLET ORAL EVERY 6 HOURS PRN
COMMUNITY

## 2023-01-03 ENCOUNTER — APPOINTMENT (OUTPATIENT)
Dept: RADIOLOGY | Facility: HOSPITAL | Age: 53
End: 2023-01-03
Attending: UROLOGY
Payer: COMMERCIAL

## 2023-01-03 ENCOUNTER — ANESTHESIA EVENT (OUTPATIENT)
Dept: SURGERY | Facility: HOSPITAL | Age: 53
End: 2023-01-03
Payer: COMMERCIAL

## 2023-01-03 ENCOUNTER — HOSPITAL ENCOUNTER (OUTPATIENT)
Facility: HOSPITAL | Age: 53
Discharge: HOME | End: 2023-01-03
Attending: UROLOGY | Admitting: UROLOGY
Payer: COMMERCIAL

## 2023-01-03 ENCOUNTER — ANESTHESIA (OUTPATIENT)
Dept: SURGERY | Facility: HOSPITAL | Age: 53
End: 2023-01-03
Payer: COMMERCIAL

## 2023-01-03 VITALS
TEMPERATURE: 98.2 F | WEIGHT: 241.4 LBS | HEART RATE: 79 BPM | DIASTOLIC BLOOD PRESSURE: 82 MMHG | SYSTOLIC BLOOD PRESSURE: 169 MMHG | HEIGHT: 72 IN | RESPIRATION RATE: 16 BRPM | BODY MASS INDEX: 32.7 KG/M2 | OXYGEN SATURATION: 94 %

## 2023-01-03 DIAGNOSIS — N20.2 CALCULUS OF KIDNEY AND URETER: Primary | ICD-10-CM

## 2023-01-03 PROCEDURE — 250N000011 HC RX IP 250 OP 636: Performed by: NURSE ANESTHETIST, CERTIFIED REGISTERED

## 2023-01-03 PROCEDURE — 710N000009 HC RECOVERY PHASE 1, LEVEL 1, PER MIN: Performed by: UROLOGY

## 2023-01-03 PROCEDURE — 258N000003 HC RX IP 258 OP 636: Performed by: ANESTHESIOLOGY

## 2023-01-03 PROCEDURE — 710N000012 HC RECOVERY PHASE 2, PER MINUTE: Performed by: UROLOGY

## 2023-01-03 PROCEDURE — 999N000180 XR SURGERY CARM FLUORO LESS THAN 5 MIN

## 2023-01-03 PROCEDURE — C1769 GUIDE WIRE: HCPCS | Performed by: UROLOGY

## 2023-01-03 PROCEDURE — 250N000025 HC SEVOFLURANE, PER MIN: Performed by: UROLOGY

## 2023-01-03 PROCEDURE — 360N000082 HC SURGERY LEVEL 2 W/ FLUORO, PER MIN: Performed by: UROLOGY

## 2023-01-03 PROCEDURE — 272N000001 HC OR GENERAL SUPPLY STERILE: Performed by: UROLOGY

## 2023-01-03 PROCEDURE — 250N000011 HC RX IP 250 OP 636: Performed by: UROLOGY

## 2023-01-03 PROCEDURE — 82365 CALCULUS SPECTROSCOPY: CPT | Performed by: UROLOGY

## 2023-01-03 PROCEDURE — 370N000017 HC ANESTHESIA TECHNICAL FEE, PER MIN: Performed by: UROLOGY

## 2023-01-03 PROCEDURE — C2617 STENT, NON-COR, TEM W/O DEL: HCPCS | Performed by: UROLOGY

## 2023-01-03 PROCEDURE — 255N000002 HC RX 255 OP 636: Performed by: UROLOGY

## 2023-01-03 PROCEDURE — 250N000009 HC RX 250: Performed by: NURSE ANESTHETIST, CERTIFIED REGISTERED

## 2023-01-03 PROCEDURE — 999N000141 HC STATISTIC PRE-PROCEDURE NURSING ASSESSMENT: Performed by: UROLOGY

## 2023-01-03 PROCEDURE — 258N000003 HC RX IP 258 OP 636: Performed by: NURSE ANESTHETIST, CERTIFIED REGISTERED

## 2023-01-03 PROCEDURE — C1894 INTRO/SHEATH, NON-LASER: HCPCS | Performed by: UROLOGY

## 2023-01-03 DEVICE — URETERAL STENT
Type: IMPLANTABLE DEVICE | Site: URETER | Status: NON-FUNCTIONAL
Brand: PERCUFLEX™ PLUS
Removed: 2023-02-20

## 2023-01-03 RX ORDER — SODIUM CHLORIDE, SODIUM LACTATE, POTASSIUM CHLORIDE, CALCIUM CHLORIDE 600; 310; 30; 20 MG/100ML; MG/100ML; MG/100ML; MG/100ML
INJECTION, SOLUTION INTRAVENOUS CONTINUOUS
Status: DISCONTINUED | OUTPATIENT
Start: 2023-01-03 | End: 2023-01-03 | Stop reason: HOSPADM

## 2023-01-03 RX ORDER — TOLTERODINE TARTRATE 1 MG/1
2 TABLET, EXTENDED RELEASE ORAL
Status: DISCONTINUED | OUTPATIENT
Start: 2023-01-03 | End: 2023-01-03 | Stop reason: HOSPADM

## 2023-01-03 RX ORDER — VASOPRESSIN IN 0.9 % NACL 2 UNIT/2ML
SYRINGE (ML) INTRAVENOUS PRN
Status: DISCONTINUED | OUTPATIENT
Start: 2023-01-03 | End: 2023-01-03

## 2023-01-03 RX ORDER — DEXAMETHASONE SODIUM PHOSPHATE 10 MG/ML
INJECTION, SOLUTION INTRAMUSCULAR; INTRAVENOUS PRN
Status: DISCONTINUED | OUTPATIENT
Start: 2023-01-03 | End: 2023-01-03

## 2023-01-03 RX ORDER — HYDROCODONE BITARTRATE AND ACETAMINOPHEN 5; 325 MG/1; MG/1
1-2 TABLET ORAL EVERY 4 HOURS PRN
Qty: 12 TABLET | Refills: 0 | Status: ON HOLD | OUTPATIENT
Start: 2023-01-03 | End: 2023-02-20

## 2023-01-03 RX ORDER — HALOPERIDOL 5 MG/ML
1 INJECTION INTRAMUSCULAR
Status: DISCONTINUED | OUTPATIENT
Start: 2023-01-03 | End: 2023-01-03 | Stop reason: HOSPADM

## 2023-01-03 RX ORDER — PROPOFOL 10 MG/ML
INJECTION, EMULSION INTRAVENOUS PRN
Status: DISCONTINUED | OUTPATIENT
Start: 2023-01-03 | End: 2023-01-03

## 2023-01-03 RX ORDER — EPHEDRINE SULFATE 50 MG/ML
INJECTION, SOLUTION INTRAMUSCULAR; INTRAVENOUS; SUBCUTANEOUS PRN
Status: DISCONTINUED | OUTPATIENT
Start: 2023-01-03 | End: 2023-01-03

## 2023-01-03 RX ORDER — FENTANYL CITRATE 50 UG/ML
25 INJECTION, SOLUTION INTRAMUSCULAR; INTRAVENOUS EVERY 5 MIN PRN
Status: DISCONTINUED | OUTPATIENT
Start: 2023-01-03 | End: 2023-01-03 | Stop reason: HOSPADM

## 2023-01-03 RX ORDER — GLYCOPYRROLATE 0.2 MG/ML
INJECTION, SOLUTION INTRAMUSCULAR; INTRAVENOUS PRN
Status: DISCONTINUED | OUTPATIENT
Start: 2023-01-03 | End: 2023-01-03

## 2023-01-03 RX ORDER — FENTANYL CITRATE 50 UG/ML
25 INJECTION, SOLUTION INTRAMUSCULAR; INTRAVENOUS
Status: DISCONTINUED | OUTPATIENT
Start: 2023-01-03 | End: 2023-01-03 | Stop reason: HOSPADM

## 2023-01-03 RX ORDER — FENTANYL CITRATE 50 UG/ML
INJECTION, SOLUTION INTRAMUSCULAR; INTRAVENOUS PRN
Status: DISCONTINUED | OUTPATIENT
Start: 2023-01-03 | End: 2023-01-03

## 2023-01-03 RX ORDER — HYDROCODONE BITARTRATE AND ACETAMINOPHEN 5; 325 MG/1; MG/1
1 TABLET ORAL EVERY 4 HOURS PRN
Status: DISCONTINUED | OUTPATIENT
Start: 2023-01-03 | End: 2023-01-03 | Stop reason: HOSPADM

## 2023-01-03 RX ORDER — FENTANYL CITRATE 50 UG/ML
50 INJECTION, SOLUTION INTRAMUSCULAR; INTRAVENOUS EVERY 5 MIN PRN
Status: DISCONTINUED | OUTPATIENT
Start: 2023-01-03 | End: 2023-01-03 | Stop reason: HOSPADM

## 2023-01-03 RX ORDER — LIDOCAINE HYDROCHLORIDE 10 MG/ML
INJECTION, SOLUTION INFILTRATION; PERINEURAL PRN
Status: DISCONTINUED | OUTPATIENT
Start: 2023-01-03 | End: 2023-01-03

## 2023-01-03 RX ORDER — NALOXONE HYDROCHLORIDE 0.4 MG/ML
0.2 INJECTION, SOLUTION INTRAMUSCULAR; INTRAVENOUS; SUBCUTANEOUS
Status: DISCONTINUED | OUTPATIENT
Start: 2023-01-03 | End: 2023-01-03 | Stop reason: HOSPADM

## 2023-01-03 RX ORDER — MEPERIDINE HYDROCHLORIDE 25 MG/ML
12.5 INJECTION INTRAMUSCULAR; INTRAVENOUS; SUBCUTANEOUS
Status: DISCONTINUED | OUTPATIENT
Start: 2023-01-03 | End: 2023-01-03 | Stop reason: HOSPADM

## 2023-01-03 RX ORDER — NALOXONE HYDROCHLORIDE 0.4 MG/ML
0.4 INJECTION, SOLUTION INTRAMUSCULAR; INTRAVENOUS; SUBCUTANEOUS
Status: DISCONTINUED | OUTPATIENT
Start: 2023-01-03 | End: 2023-01-03 | Stop reason: HOSPADM

## 2023-01-03 RX ORDER — HYDROMORPHONE HYDROCHLORIDE 1 MG/ML
0.4 INJECTION, SOLUTION INTRAMUSCULAR; INTRAVENOUS; SUBCUTANEOUS EVERY 5 MIN PRN
Status: DISCONTINUED | OUTPATIENT
Start: 2023-01-03 | End: 2023-01-03 | Stop reason: HOSPADM

## 2023-01-03 RX ORDER — HYDROMORPHONE HYDROCHLORIDE 1 MG/ML
0.2 INJECTION, SOLUTION INTRAMUSCULAR; INTRAVENOUS; SUBCUTANEOUS EVERY 5 MIN PRN
Status: DISCONTINUED | OUTPATIENT
Start: 2023-01-03 | End: 2023-01-03 | Stop reason: HOSPADM

## 2023-01-03 RX ORDER — LIDOCAINE 40 MG/G
CREAM TOPICAL
Status: DISCONTINUED | OUTPATIENT
Start: 2023-01-03 | End: 2023-01-03 | Stop reason: HOSPADM

## 2023-01-03 RX ORDER — ATROPA BELLADONNA AND OPIUM 16.2; 3 MG/1; MG/1
1 SUPPOSITORY RECTAL EVERY 8 HOURS PRN
Status: DISCONTINUED | OUTPATIENT
Start: 2023-01-03 | End: 2023-01-03 | Stop reason: HOSPADM

## 2023-01-03 RX ORDER — CEFAZOLIN SODIUM/WATER 2 G/20 ML
2 SYRINGE (ML) INTRAVENOUS
Status: COMPLETED | OUTPATIENT
Start: 2023-01-03 | End: 2023-01-03

## 2023-01-03 RX ORDER — ACETAMINOPHEN 325 MG/1
650 TABLET ORAL EVERY 4 HOURS PRN
Status: DISCONTINUED | OUTPATIENT
Start: 2023-01-03 | End: 2023-01-03 | Stop reason: HOSPADM

## 2023-01-03 RX ORDER — ONDANSETRON 2 MG/ML
4 INJECTION INTRAMUSCULAR; INTRAVENOUS EVERY 30 MIN PRN
Status: DISCONTINUED | OUTPATIENT
Start: 2023-01-03 | End: 2023-01-03 | Stop reason: HOSPADM

## 2023-01-03 RX ORDER — CEFAZOLIN SODIUM/WATER 2 G/20 ML
2 SYRINGE (ML) INTRAVENOUS SEE ADMIN INSTRUCTIONS
Status: DISCONTINUED | OUTPATIENT
Start: 2023-01-03 | End: 2023-01-03 | Stop reason: HOSPADM

## 2023-01-03 RX ORDER — ONDANSETRON 4 MG/1
4 TABLET, ORALLY DISINTEGRATING ORAL EVERY 30 MIN PRN
Status: DISCONTINUED | OUTPATIENT
Start: 2023-01-03 | End: 2023-01-03 | Stop reason: HOSPADM

## 2023-01-03 RX ADMIN — Medication 0.5 UNITS: at 15:40

## 2023-01-03 RX ADMIN — PROPOFOL 200 MG: 10 INJECTION, EMULSION INTRAVENOUS at 15:14

## 2023-01-03 RX ADMIN — FENTANYL CITRATE 100 MCG: 50 INJECTION, SOLUTION INTRAMUSCULAR; INTRAVENOUS at 15:14

## 2023-01-03 RX ADMIN — PROPOFOL 50 MG: 10 INJECTION, EMULSION INTRAVENOUS at 16:04

## 2023-01-03 RX ADMIN — Medication 1 UNITS: at 15:43

## 2023-01-03 RX ADMIN — GLYCOPYRROLATE 0.2 MG: 0.2 INJECTION, SOLUTION INTRAMUSCULAR; INTRAVENOUS at 15:31

## 2023-01-03 RX ADMIN — Medication 5 MG: at 15:50

## 2023-01-03 RX ADMIN — MIDAZOLAM 2 MG: 1 INJECTION INTRAMUSCULAR; INTRAVENOUS at 15:06

## 2023-01-03 RX ADMIN — PHENYLEPHRINE HYDROCHLORIDE 100 MCG: 10 INJECTION INTRAVENOUS at 15:23

## 2023-01-03 RX ADMIN — Medication 2 G: at 14:59

## 2023-01-03 RX ADMIN — DEXAMETHASONE SODIUM PHOSPHATE 10 MG: 10 INJECTION, SOLUTION INTRAMUSCULAR; INTRAVENOUS at 15:14

## 2023-01-03 RX ADMIN — PHENYLEPHRINE HYDROCHLORIDE 100 MCG: 10 INJECTION INTRAVENOUS at 15:37

## 2023-01-03 RX ADMIN — PHENYLEPHRINE HYDROCHLORIDE 100 MCG: 10 INJECTION INTRAVENOUS at 15:49

## 2023-01-03 RX ADMIN — PHENYLEPHRINE HYDROCHLORIDE 100 MCG: 10 INJECTION INTRAVENOUS at 15:44

## 2023-01-03 RX ADMIN — PHENYLEPHRINE HYDROCHLORIDE 100 MCG: 10 INJECTION INTRAVENOUS at 15:36

## 2023-01-03 RX ADMIN — PHENYLEPHRINE HYDROCHLORIDE 100 MCG: 10 INJECTION INTRAVENOUS at 15:32

## 2023-01-03 RX ADMIN — SODIUM CHLORIDE, POTASSIUM CHLORIDE, SODIUM LACTATE AND CALCIUM CHLORIDE: 600; 310; 30; 20 INJECTION, SOLUTION INTRAVENOUS at 16:05

## 2023-01-03 RX ADMIN — PHENYLEPHRINE HYDROCHLORIDE 100 MCG: 10 INJECTION INTRAVENOUS at 15:28

## 2023-01-03 RX ADMIN — PHENYLEPHRINE HYDROCHLORIDE 100 MCG: 10 INJECTION INTRAVENOUS at 15:47

## 2023-01-03 RX ADMIN — Medication 10 MG: at 15:57

## 2023-01-03 RX ADMIN — Medication 10 MG: at 16:07

## 2023-01-03 RX ADMIN — Medication 0.5 UNITS: at 15:38

## 2023-01-03 RX ADMIN — PHENYLEPHRINE HYDROCHLORIDE 100 MCG: 10 INJECTION INTRAVENOUS at 15:41

## 2023-01-03 RX ADMIN — LIDOCAINE HYDROCHLORIDE 30 MG: 10 INJECTION, SOLUTION INFILTRATION; PERINEURAL at 15:14

## 2023-01-03 RX ADMIN — PHENYLEPHRINE HYDROCHLORIDE 100 MCG: 10 INJECTION INTRAVENOUS at 15:35

## 2023-01-03 RX ADMIN — PHENYLEPHRINE HYDROCHLORIDE 100 MCG: 10 INJECTION INTRAVENOUS at 16:07

## 2023-01-03 RX ADMIN — PROPOFOL 50 MG: 10 INJECTION, EMULSION INTRAVENOUS at 16:02

## 2023-01-03 RX ADMIN — SODIUM CHLORIDE, POTASSIUM CHLORIDE, SODIUM LACTATE AND CALCIUM CHLORIDE: 600; 310; 30; 20 INJECTION, SOLUTION INTRAVENOUS at 14:51

## 2023-01-03 ASSESSMENT — ACTIVITIES OF DAILY LIVING (ADL)
ADLS_ACUITY_SCORE: 35
ADLS_ACUITY_SCORE: 35

## 2023-01-03 NOTE — ANESTHESIA PREPROCEDURE EVALUATION
Anesthesia Pre-Procedure Evaluation    Patient: Roberto Albright   MRN: [id number] : 1970        Procedure : Procedure(s):  CYSTOSCOPY, BILATERAL RETROGRADE PYELOGRAM, RIGHT URETEROSCOPY WITH LASER LITHOTRIPSY, BILATERAL URETERAL STENT PLACEMENT          Past Medical History:   Diagnosis Date     CAD (coronary artery disease)      Calculus of kidney      Hyperlipidemia      Hypertension       Past Surgical History:   Procedure Laterality Date     BYPASS GRAFT ARTERY CORONARY       HERNIA REPAIR Left       No Known Allergies   Social History     Tobacco Use     Smoking status: Every Day     Smokeless tobacco: Not on file   Substance Use Topics     Alcohol use: No      Wt Readings from Last 1 Encounters:   23 109.5 kg (241 lb 6.4 oz)        Anesthesia Evaluation   Pt has had prior anesthetic. Type: General.    No history of anesthetic complications       ROS/MED HX  ENT/Pulmonary:  - neg pulmonary ROS     Neurologic:  - neg neurologic ROS     Cardiovascular:     (+) Dyslipidemia hypertension--CAD -CABG-stent-Taking blood thinners     METS/Exercise Tolerance:     Hematologic:  - neg hematologic  ROS     Musculoskeletal:  - neg musculoskeletal ROS     GI/Hepatic:  - neg GI/hepatic ROS     Renal/Genitourinary:  - neg Renal ROS     Endo:     (+) Obesity,     Psychiatric/Substance Use:  - neg psychiatric ROS     Infectious Disease:  - neg infectious disease ROS     Malignancy:  - neg malignancy ROS     Other:  - neg other ROS          Physical Exam    Airway  airway exam normal      Mallampati: I   TM distance: > 3 FB   Neck ROM: full   Mouth opening: > 3 cm    Respiratory Devices and Support         Dental  no notable dental history         Cardiovascular   cardiovascular exam normal       Rhythm and rate: regular and normal     Pulmonary   pulmonary exam normal        breath sounds clear to auscultation           OUTSIDE LABS:  CBC:   Lab Results   Component Value Date    WBC 11.9 (H) 2017    HGB 17.2  01/27/2017    HCT 51.8 01/27/2017     01/27/2017     BMP:   Lab Results   Component Value Date     01/27/2017    POTASSIUM 3.9 12/31/2020    POTASSIUM 3.6 01/27/2017    CHLORIDE 109 01/27/2017    CO2 25 01/27/2017    BUN 15 01/27/2017    CR 1.00 12/31/2020    CR 0.78 01/27/2017     (A) 12/31/2020    GLC 90 01/27/2017     COAGS: No results found for: PTT, INR, FIBR  POC: No results found for: BGM, HCG, HCGS  HEPATIC: No results found for: ALBUMIN, PROTTOTAL, ALT, AST, GGT, ALKPHOS, BILITOTAL, BILIDIRECT, BETY  OTHER:   Lab Results   Component Value Date    URIEL 8.2 (L) 01/27/2017       Anesthesia Plan    ASA Status:  3   NPO Status:  NPO Appropriate    Anesthesia Type: General.     - Airway: LMA   Induction: Intravenous, Propofol.   Maintenance: Inhalation.        Consents    Anesthesia Plan(s) and associated risks, benefits, and realistic alternatives discussed. Questions answered and patient/representative(s) expressed understanding.    - Discussed:     - Discussed with:  Patient, Spouse      - Extended Intubation/Ventilatory Support Discussed: No.      - Patient is DNR/DNI Status: No    Use of blood products discussed: No .     Postoperative Care    Pain management: Oral pain medications.   PONV prophylaxis: Ondansetron (or other 5HT-3), Dexamethasone or Solumedrol     Comments:                Addison Glass MD

## 2023-01-03 NOTE — ANESTHESIA CARE TRANSFER NOTE
Patient: Roberto Albright    Procedure: Procedure(s):  CYSTOSCOPY, BILATERAL RETROGRADE PYELOGRAM, RIGHT URETEROSCOPY WITH LASER LITHOTRIPSY, BILATERAL URETERAL STENT PLACEMENT       Diagnosis: Calculus of kidney and ureter [N20.2]  Diagnosis Additional Information: No value filed.    Anesthesia Type:   General     Note:    Oropharynx: oropharynx clear of all foreign objects and spontaneously breathing  Level of Consciousness: drowsy  Oxygen Supplementation: face mask  Level of Supplemental Oxygen (L/min / FiO2): 6  Independent Airway: airway patency satisfactory and stable  Dentition: dentition unchanged  Vital Signs Stable: post-procedure vital signs reviewed and stable  Report to RN Given: handoff report given  Patient transferred to: PACU    Handoff Report: Identifed the Patient, Identified the Reponsible Provider, Reviewed the pertinent medical history, Discussed the surgical course, Reviewed Intra-OP anesthesia mangement and issues during anesthesia, Set expectations for post-procedure period and Allowed opportunity for questions and acknowledgement of understanding      Vitals:  Vitals Value Taken Time   /67 01/03/23 1622   Temp     Pulse 77 01/03/23 1623   Resp 19 01/03/23 1623   SpO2 98 % 01/03/23 1623   Vitals shown include unvalidated device data.    Electronically Signed By: WALKER Montaño CRNA  January 3, 2023  4:24 PM

## 2023-01-03 NOTE — ANESTHESIA PROCEDURE NOTES
Airway       Patient location during procedure: OR       Procedure Start/Stop Times: 1/3/2023 3:17 PM  Staff -        Anesthesiologist:  Addison Glass MD       CRNA: Vicente Nuñez APRN CRNA       Performed By: CRNAIndications and Patient Condition       Indications for airway management: shelley-procedural       Induction type:intravenous       Mask difficulty assessment: 1 - vent by mask    Final Airway Details       Final airway type: supraglottic airway    Supraglottic Airway Details        Type: LMA       Brand: Ambu AuraGain       LMA size: 5    Post intubation assessment        Placement verified by: capnometry, equal breath sounds and chest rise        Number of attempts at approach: 1       Number of other approaches attempted: 0       Secured with: silk tape       Ease of procedure: easy       Dentition: Intact and Unchanged    Medication(s) Administered   Medication Administration Time: 1/3/2023 3:17 PM

## 2023-01-03 NOTE — OP NOTE
Location: [facility]     Patient Name: Roberto Albright  Patient : 1970  Patient MRN: [id number]  Patient CSN: [id number]  Patient PCP: [facility]    Date of Service: 23     Pre-operative diagnosis: Left renal stones with hydronephrosis, right ureteral stones with hydronephrosis    Post-operative diagnosis: Left renal stones with hydronephrosis, right ureteral stones with hydronephrosisstone    Procedure:  Cystoscopy, Bilateral retrograde pyelogram, Right ureteroscopy with laser lithotripsy, Bilateral 6 Fr x 28 cm ureteral stent placement    Surgeon: Dr. Ishan Serrano    EBL: 5 mL    Anesthesia: General    Indication: 52 year old male who was found to have 6 right distal ureteral stones with hydronephrosis as well as a 1.5 x 1.4 cm left renal pelvis/UPJ stone with a cluster of stones in the lower pole (~1 cm) with hydronephrosis.  I recommended treatment of his stones via a staged approach by treating the right side with ureteroscopy and then treat the left side via PCNL.    Risks, benefits, and alternatives to treatment were discussed with the patient and the patient elected to proceed.    Findings: His urethra was normal. His prostate his bilobar hyperplasia without a median lobe. His bladder did not have any trabeculation. No tumors or stones were in the bladder.  Bilateral ureteral orifices were normal.  On the right side, he had multiple right distal ureteral stones and I estimate he had about a 3 cm steinstrasse.  There was marked right hydronephrosis.  On the left side, the UPJ/renal pelvis and lower pole stones were seen There was mild left hydronephrosis and very little room for a stent.  Bilateral ureteral stents were in good position.    Specimens: Right ureteral stones    Procedure:  After written informed consent was obtained the patient was brought into the operating room.  The patient was properly identified prior to the procedure, received preprocedure antibiotics,  and had sequential compression devices on the legs.  The patient was then induced under anesthesia.    The patient was placed in dorsal lithotomy position and prepped and draped in the usual sterile fashion.  Cystoscopy was performed with the above findings.    A Right retrograde pyelogram was performed with the above findings.    A straight sensor wire was placed into the Right kidney. Right rigid ureteroscopy was performed as above. The stones were broken into small fragments using the Holmium laser and 200 nm laser fiber. The Bolton Halo basket was used to remove stone fragments.  Right flexible ureteroscopy was performed and no large stones were remaining in the entire ureter.  All pieces larger than the diameter of the basket were removed.  The straight sensor wire was exchanged for the Super Stiff wire. A 6 Fr x 28 cm ureteral stent was then deployed over the Super Stiff wire. There was a good coil in the kidney and a good coil in the bladder.  I verified the distal coil via cystoscopy.     A straight sensor wire was placed into the left kidney. A left retrograde pyelogram was performed using a 5 Fr open-ended ureteral catheter over the straight sensor wire.  The super stiff wire was placed into left kidney.  A 6 Fr x 28 cm ureteral stent was then deployed over the Super Stiff wire. There was a good coil in the kidney and a good coil in the bladder.  I verified the distal coil via cystoscopy.     I emptied the bladder and removed the scope. At this point, the procedure was completed.  He tolerated the procedure well.    Plan: Home. Follow up on 1/11/23 for right ureteral stent placement. Will have left PCNL in the near future.    Ishan Serrano MD  4:12 PM

## 2023-01-03 NOTE — INTERVAL H&P NOTE
I have reviewed the surgical (or preoperative) H&P that is linked to this encounter, and examined the patient. There are no significant changes    Clinical Conditions Present on Arrival:  Clinically Significant Risk Factors Present on Admission                   # Drug Induced Platelet Defect: home medication list includes an antiplatelet medication  # Obesity: Estimated body mass index is 32.74 kg/m  as calculated from the following:    Height as of this encounter: 1.829 m (6').    Weight as of this encounter: 109.5 kg (241 lb 6.4 oz).

## 2023-01-03 NOTE — ANESTHESIA POSTPROCEDURE EVALUATION
Patient: Roberto Albright    Procedure: Procedure(s):  CYSTOSCOPY, BILATERAL RETROGRADE PYELOGRAM, RIGHT URETEROSCOPY WITH LASER LITHOTRIPSY, BILATERAL URETERAL STENT PLACEMENT       Anesthesia Type:  General    Note:  Disposition: Outpatient   Postop Pain Control: Uneventful            Sign Out: Well controlled pain   PONV: No   Neuro/Psych: Uneventful            Sign Out: Acceptable/Baseline neuro status   Airway/Respiratory: Uneventful            Sign Out: Acceptable/Baseline resp. status   CV/Hemodynamics: Uneventful            Sign Out: Acceptable CV status; No obvious hypovolemia; No obvious fluid overload   Other NRE: NONE   DID A NON-ROUTINE EVENT OCCUR? No           Last vitals:  Vitals Value Taken Time   /76 01/03/23 1645   Temp     Pulse 85 01/03/23 1649   Resp 20 01/03/23 1648   SpO2 94 % 01/03/23 1649   Vitals shown include unvalidated device data.    Electronically Signed By: Addison Glass MD  January 3, 2023  4:59 PM

## 2023-01-06 LAB
APPEARANCE STONE: NORMAL
COMPN STONE: NORMAL
SPECIMEN WT: 27 MG

## 2023-01-20 ENCOUNTER — APPOINTMENT (OUTPATIENT)
Dept: CT IMAGING | Facility: CLINIC | Age: 53
End: 2023-01-20
Attending: EMERGENCY MEDICINE
Payer: COMMERCIAL

## 2023-01-20 ENCOUNTER — ANESTHESIA EVENT (OUTPATIENT)
Dept: SURGERY | Facility: CLINIC | Age: 53
End: 2023-01-20
Payer: COMMERCIAL

## 2023-01-20 ENCOUNTER — HOSPITAL ENCOUNTER (INPATIENT)
Facility: CLINIC | Age: 53
LOS: 3 days | Discharge: HOME | End: 2023-01-23
Attending: EMERGENCY MEDICINE | Admitting: INTERNAL MEDICINE
Payer: COMMERCIAL

## 2023-01-20 ENCOUNTER — APPOINTMENT (OUTPATIENT)
Dept: GENERAL RADIOLOGY | Facility: CLINIC | Age: 53
End: 2023-01-20
Attending: STUDENT IN AN ORGANIZED HEALTH CARE EDUCATION/TRAINING PROGRAM
Payer: COMMERCIAL

## 2023-01-20 ENCOUNTER — ANESTHESIA (OUTPATIENT)
Dept: SURGERY | Facility: CLINIC | Age: 53
End: 2023-01-20
Payer: COMMERCIAL

## 2023-01-20 DIAGNOSIS — N20.0 KIDNEY STONE ON LEFT SIDE: ICD-10-CM

## 2023-01-20 DIAGNOSIS — N10 PYELONEPHRITIS, ACUTE: ICD-10-CM

## 2023-01-20 DIAGNOSIS — N20.0 KIDNEY STONE: ICD-10-CM

## 2023-01-20 DIAGNOSIS — R65.20 SEVERE SEPSIS (H): Primary | ICD-10-CM

## 2023-01-20 DIAGNOSIS — A41.9 SEVERE SEPSIS (H): Primary | ICD-10-CM

## 2023-01-20 LAB
ALBUMIN SERPL BCG-MCNC: 3.5 G/DL (ref 3.5–5.2)
ALBUMIN UR-MCNC: 70 MG/DL
ALP SERPL-CCNC: 152 U/L (ref 40–129)
ALT SERPL W P-5'-P-CCNC: 127 U/L (ref 10–50)
ANION GAP SERPL CALCULATED.3IONS-SCNC: 15 MMOL/L (ref 7–15)
APPEARANCE UR: ABNORMAL
AST SERPL W P-5'-P-CCNC: 96 U/L (ref 10–50)
BACTERIA #/AREA URNS HPF: ABNORMAL /HPF
BASOPHILS # BLD AUTO: 0 10E3/UL (ref 0–0.2)
BASOPHILS NFR BLD AUTO: 0 %
BILIRUB SERPL-MCNC: 1.5 MG/DL
BILIRUB UR QL STRIP: NEGATIVE
BUN SERPL-MCNC: 39.4 MG/DL (ref 6–20)
CALCIUM SERPL-MCNC: 9 MG/DL (ref 8.6–10)
CHLORIDE SERPL-SCNC: 96 MMOL/L (ref 98–107)
COLOR UR AUTO: YELLOW
CREAT SERPL-MCNC: 2.53 MG/DL (ref 0.67–1.17)
DEPRECATED HCO3 PLAS-SCNC: 22 MMOL/L (ref 22–29)
EOSINOPHIL # BLD AUTO: 0 10E3/UL (ref 0–0.7)
EOSINOPHIL NFR BLD AUTO: 0 %
ERYTHROCYTE [DISTWIDTH] IN BLOOD BY AUTOMATED COUNT: 14.1 % (ref 10–15)
FLUAV RNA SPEC QL NAA+PROBE: NEGATIVE
FLUBV RNA RESP QL NAA+PROBE: NEGATIVE
GFR SERPL CREATININE-BSD FRML MDRD: 29 ML/MIN/1.73M2
GLUCOSE BLDC GLUCOMTR-MCNC: 172 MG/DL (ref 70–99)
GLUCOSE SERPL-MCNC: 110 MG/DL (ref 70–99)
GLUCOSE UR STRIP-MCNC: NEGATIVE MG/DL
HCT VFR BLD AUTO: 43.2 % (ref 40–53)
HGB BLD-MCNC: 14.4 G/DL (ref 13.3–17.7)
HGB UR QL STRIP: ABNORMAL
HOLD SPECIMEN: NORMAL
HYALINE CASTS: 74 /LPF
IMM GRANULOCYTES # BLD: 0.3 10E3/UL
IMM GRANULOCYTES NFR BLD: 1 %
KETONES UR STRIP-MCNC: NEGATIVE MG/DL
LACTATE SERPL-SCNC: 1.1 MMOL/L (ref 0.7–2)
LEUKOCYTE ESTERASE UR QL STRIP: ABNORMAL
LIPASE SERPL-CCNC: 78 U/L (ref 13–60)
LYMPHOCYTES # BLD AUTO: 1.2 10E3/UL (ref 0.8–5.3)
LYMPHOCYTES NFR BLD AUTO: 6 %
MCH RBC QN AUTO: 27.1 PG (ref 26.5–33)
MCHC RBC AUTO-ENTMCNC: 33.3 G/DL (ref 31.5–36.5)
MCV RBC AUTO: 81 FL (ref 78–100)
MONOCYTES # BLD AUTO: 2.1 10E3/UL (ref 0–1.3)
MONOCYTES NFR BLD AUTO: 9 %
MUCOUS THREADS #/AREA URNS LPF: PRESENT /LPF
NEUTROPHILS # BLD AUTO: 18.6 10E3/UL (ref 1.6–8.3)
NEUTROPHILS NFR BLD AUTO: 84 %
NITRATE UR QL: NEGATIVE
NRBC # BLD AUTO: 0 10E3/UL
NRBC BLD AUTO-RTO: 0 /100
PH UR STRIP: 5.5 [PH] (ref 5–7)
PLATELET # BLD AUTO: 233 10E3/UL (ref 150–450)
POTASSIUM SERPL-SCNC: 3.4 MMOL/L (ref 3.4–5.3)
PROCALCITONIN SERPL IA-MCNC: 2.51 NG/ML
PROT SERPL-MCNC: 6.7 G/DL (ref 6.4–8.3)
RBC # BLD AUTO: 5.31 10E6/UL (ref 4.4–5.9)
RBC URINE: >182 /HPF
RSV RNA SPEC NAA+PROBE: NEGATIVE
SARS-COV-2 RNA RESP QL NAA+PROBE: NEGATIVE
SODIUM SERPL-SCNC: 133 MMOL/L (ref 136–145)
SP GR UR STRIP: 1.02 (ref 1–1.03)
SQUAMOUS EPITHELIAL: 1 /HPF
UROBILINOGEN UR STRIP-MCNC: NORMAL MG/DL
WBC # BLD AUTO: 22.2 10E3/UL (ref 4–11)
WBC CLUMPS #/AREA URNS HPF: PRESENT /HPF
WBC URINE: >182 /HPF

## 2023-01-20 PROCEDURE — 96375 TX/PRO/DX INJ NEW DRUG ADDON: CPT

## 2023-01-20 PROCEDURE — 258N000003 HC RX IP 258 OP 636: Performed by: NURSE ANESTHETIST, CERTIFIED REGISTERED

## 2023-01-20 PROCEDURE — 85025 COMPLETE CBC W/AUTO DIFF WBC: CPT | Performed by: EMERGENCY MEDICINE

## 2023-01-20 PROCEDURE — 250N000013 HC RX MED GY IP 250 OP 250 PS 637: Performed by: INTERNAL MEDICINE

## 2023-01-20 PROCEDURE — 250N000011 HC RX IP 250 OP 636: Performed by: NURSE ANESTHETIST, CERTIFIED REGISTERED

## 2023-01-20 PROCEDURE — 96365 THER/PROPH/DIAG IV INF INIT: CPT

## 2023-01-20 PROCEDURE — 999N000179 XR SURGERY CARM FLUORO LESS THAN 5 MIN W STILLS: Mod: TC

## 2023-01-20 PROCEDURE — 80053 COMPREHEN METABOLIC PANEL: CPT | Performed by: EMERGENCY MEDICINE

## 2023-01-20 PROCEDURE — 258N000003 HC RX IP 258 OP 636: Performed by: ANESTHESIOLOGY

## 2023-01-20 PROCEDURE — 93005 ELECTROCARDIOGRAM TRACING: CPT

## 2023-01-20 PROCEDURE — 71250 CT THORAX DX C-: CPT

## 2023-01-20 PROCEDURE — 74420 UROGRAPHY RTRGR +-KUB: CPT | Mod: 26 | Performed by: STUDENT IN AN ORGANIZED HEALTH CARE EDUCATION/TRAINING PROGRAM

## 2023-01-20 PROCEDURE — 84145 PROCALCITONIN (PCT): CPT | Performed by: EMERGENCY MEDICINE

## 2023-01-20 PROCEDURE — 250N000011 HC RX IP 250 OP 636: Performed by: INTERNAL MEDICINE

## 2023-01-20 PROCEDURE — 710N000009 HC RECOVERY PHASE 1, LEVEL 1, PER MIN: Performed by: STUDENT IN AN ORGANIZED HEALTH CARE EDUCATION/TRAINING PROGRAM

## 2023-01-20 PROCEDURE — 83605 ASSAY OF LACTIC ACID: CPT | Performed by: EMERGENCY MEDICINE

## 2023-01-20 PROCEDURE — 36415 COLL VENOUS BLD VENIPUNCTURE: CPT | Performed by: EMERGENCY MEDICINE

## 2023-01-20 PROCEDURE — 258N000003 HC RX IP 258 OP 636: Performed by: INTERNAL MEDICINE

## 2023-01-20 PROCEDURE — C2617 STENT, NON-COR, TEM W/O DEL: HCPCS | Performed by: STUDENT IN AN ORGANIZED HEALTH CARE EDUCATION/TRAINING PROGRAM

## 2023-01-20 PROCEDURE — 120N000001 HC R&B MED SURG/OB

## 2023-01-20 PROCEDURE — 0T778DZ DILATION OF LEFT URETER WITH INTRALUMINAL DEVICE, VIA NATURAL OR ARTIFICIAL OPENING ENDOSCOPIC: ICD-10-PCS | Performed by: STUDENT IN AN ORGANIZED HEALTH CARE EDUCATION/TRAINING PROGRAM

## 2023-01-20 PROCEDURE — 87088 URINE BACTERIA CULTURE: CPT | Performed by: STUDENT IN AN ORGANIZED HEALTH CARE EDUCATION/TRAINING PROGRAM

## 2023-01-20 PROCEDURE — 87149 DNA/RNA DIRECT PROBE: CPT | Performed by: EMERGENCY MEDICINE

## 2023-01-20 PROCEDURE — 258N000003 HC RX IP 258 OP 636: Performed by: EMERGENCY MEDICINE

## 2023-01-20 PROCEDURE — C9803 HOPD COVID-19 SPEC COLLECT: HCPCS

## 2023-01-20 PROCEDURE — 250N000009 HC RX 250: Performed by: NURSE ANESTHETIST, CERTIFIED REGISTERED

## 2023-01-20 PROCEDURE — 99221 1ST HOSP IP/OBS SF/LOW 40: CPT | Mod: 25 | Performed by: STUDENT IN AN ORGANIZED HEALTH CARE EDUCATION/TRAINING PROGRAM

## 2023-01-20 PROCEDURE — 255N000002 HC RX 255 OP 636: Performed by: STUDENT IN AN ORGANIZED HEALTH CARE EDUCATION/TRAINING PROGRAM

## 2023-01-20 PROCEDURE — 99285 EMERGENCY DEPT VISIT HI MDM: CPT | Mod: 25,CS

## 2023-01-20 PROCEDURE — 250N000009 HC RX 250: Performed by: STUDENT IN AN ORGANIZED HEALTH CARE EDUCATION/TRAINING PROGRAM

## 2023-01-20 PROCEDURE — 52332 CYSTOSCOPY AND TREATMENT: CPT | Mod: LT | Performed by: STUDENT IN AN ORGANIZED HEALTH CARE EDUCATION/TRAINING PROGRAM

## 2023-01-20 PROCEDURE — 87637 SARSCOV2&INF A&B&RSV AMP PRB: CPT | Performed by: EMERGENCY MEDICINE

## 2023-01-20 PROCEDURE — 99223 1ST HOSP IP/OBS HIGH 75: CPT | Mod: AI | Performed by: INTERNAL MEDICINE

## 2023-01-20 PROCEDURE — 250N000011 HC RX IP 250 OP 636: Performed by: EMERGENCY MEDICINE

## 2023-01-20 PROCEDURE — 96361 HYDRATE IV INFUSION ADD-ON: CPT

## 2023-01-20 PROCEDURE — 250N000025 HC SEVOFLURANE, PER MIN: Performed by: STUDENT IN AN ORGANIZED HEALTH CARE EDUCATION/TRAINING PROGRAM

## 2023-01-20 PROCEDURE — 999N000141 HC STATISTIC PRE-PROCEDURE NURSING ASSESSMENT: Performed by: STUDENT IN AN ORGANIZED HEALTH CARE EDUCATION/TRAINING PROGRAM

## 2023-01-20 PROCEDURE — 370N000017 HC ANESTHESIA TECHNICAL FEE, PER MIN: Performed by: STUDENT IN AN ORGANIZED HEALTH CARE EDUCATION/TRAINING PROGRAM

## 2023-01-20 PROCEDURE — 87077 CULTURE AEROBIC IDENTIFY: CPT | Performed by: EMERGENCY MEDICINE

## 2023-01-20 PROCEDURE — 272N000001 HC OR GENERAL SUPPLY STERILE: Performed by: STUDENT IN AN ORGANIZED HEALTH CARE EDUCATION/TRAINING PROGRAM

## 2023-01-20 PROCEDURE — C1769 GUIDE WIRE: HCPCS | Performed by: STUDENT IN AN ORGANIZED HEALTH CARE EDUCATION/TRAINING PROGRAM

## 2023-01-20 PROCEDURE — 81001 URINALYSIS AUTO W/SCOPE: CPT | Performed by: EMERGENCY MEDICINE

## 2023-01-20 PROCEDURE — 83690 ASSAY OF LIPASE: CPT | Performed by: EMERGENCY MEDICINE

## 2023-01-20 PROCEDURE — 360N000083 HC SURGERY LEVEL 3 W/ FLUORO, PER MIN: Performed by: STUDENT IN AN ORGANIZED HEALTH CARE EDUCATION/TRAINING PROGRAM

## 2023-01-20 DEVICE — STENT URETERAL POLARIS ULTRA 6FRX28CM M0061921340: Type: IMPLANTABLE DEVICE | Site: URETER | Status: FUNCTIONAL

## 2023-01-20 RX ORDER — SODIUM CHLORIDE 9 MG/ML
INJECTION, SOLUTION INTRAVENOUS CONTINUOUS
Status: DISCONTINUED | OUTPATIENT
Start: 2023-01-20 | End: 2023-01-22

## 2023-01-20 RX ORDER — FENTANYL CITRATE 50 UG/ML
25 INJECTION, SOLUTION INTRAMUSCULAR; INTRAVENOUS EVERY 5 MIN PRN
Status: DISCONTINUED | OUTPATIENT
Start: 2023-01-20 | End: 2023-01-20 | Stop reason: HOSPADM

## 2023-01-20 RX ORDER — LIDOCAINE HYDROCHLORIDE 10 MG/ML
INJECTION, SOLUTION EPIDURAL; INFILTRATION; INTRACAUDAL; PERINEURAL PRN
Status: DISCONTINUED | OUTPATIENT
Start: 2023-01-20 | End: 2023-01-20

## 2023-01-20 RX ORDER — ONDANSETRON 2 MG/ML
4 INJECTION INTRAMUSCULAR; INTRAVENOUS EVERY 30 MIN PRN
Status: DISCONTINUED | OUTPATIENT
Start: 2023-01-20 | End: 2023-01-20 | Stop reason: HOSPADM

## 2023-01-20 RX ORDER — ACETAMINOPHEN 500 MG
500-1000 TABLET ORAL EVERY 6 HOURS PRN
Status: DISCONTINUED | OUTPATIENT
Start: 2023-01-20 | End: 2023-01-23 | Stop reason: HOSPADM

## 2023-01-20 RX ORDER — ONDANSETRON 4 MG/1
4 TABLET, ORALLY DISINTEGRATING ORAL EVERY 30 MIN PRN
Status: DISCONTINUED | OUTPATIENT
Start: 2023-01-20 | End: 2023-01-20 | Stop reason: HOSPADM

## 2023-01-20 RX ORDER — NALOXONE HYDROCHLORIDE 0.4 MG/ML
0.2 INJECTION, SOLUTION INTRAMUSCULAR; INTRAVENOUS; SUBCUTANEOUS
Status: DISCONTINUED | OUTPATIENT
Start: 2023-01-20 | End: 2023-01-23 | Stop reason: HOSPADM

## 2023-01-20 RX ORDER — CLOPIDOGREL BISULFATE 75 MG/1
75 TABLET ORAL DAILY
Status: DISCONTINUED | OUTPATIENT
Start: 2023-01-21 | End: 2023-01-23 | Stop reason: HOSPADM

## 2023-01-20 RX ORDER — LIDOCAINE 40 MG/G
CREAM TOPICAL
Status: DISCONTINUED | OUTPATIENT
Start: 2023-01-20 | End: 2023-01-23 | Stop reason: HOSPADM

## 2023-01-20 RX ORDER — ATORVASTATIN CALCIUM 40 MG/1
80 TABLET, FILM COATED ORAL DAILY
Status: DISCONTINUED | OUTPATIENT
Start: 2023-01-21 | End: 2023-01-23 | Stop reason: HOSPADM

## 2023-01-20 RX ORDER — ASPIRIN 81 MG/1
81 TABLET ORAL DAILY
Status: DISCONTINUED | OUTPATIENT
Start: 2023-01-21 | End: 2023-01-23 | Stop reason: HOSPADM

## 2023-01-20 RX ORDER — SODIUM CHLORIDE, SODIUM LACTATE, POTASSIUM CHLORIDE, CALCIUM CHLORIDE 600; 310; 30; 20 MG/100ML; MG/100ML; MG/100ML; MG/100ML
INJECTION, SOLUTION INTRAVENOUS CONTINUOUS
Status: DISCONTINUED | OUTPATIENT
Start: 2023-01-20 | End: 2023-01-20 | Stop reason: HOSPADM

## 2023-01-20 RX ORDER — PROPOFOL 10 MG/ML
INJECTION, EMULSION INTRAVENOUS PRN
Status: DISCONTINUED | OUTPATIENT
Start: 2023-01-20 | End: 2023-01-20

## 2023-01-20 RX ORDER — DEXAMETHASONE SODIUM PHOSPHATE 4 MG/ML
INJECTION, SOLUTION INTRA-ARTICULAR; INTRALESIONAL; INTRAMUSCULAR; INTRAVENOUS; SOFT TISSUE PRN
Status: DISCONTINUED | OUTPATIENT
Start: 2023-01-20 | End: 2023-01-20

## 2023-01-20 RX ORDER — HYDROMORPHONE HCL IN WATER/PF 6 MG/30 ML
0.2 PATIENT CONTROLLED ANALGESIA SYRINGE INTRAVENOUS EVERY 5 MIN PRN
Status: DISCONTINUED | OUTPATIENT
Start: 2023-01-20 | End: 2023-01-20 | Stop reason: HOSPADM

## 2023-01-20 RX ORDER — ALBUTEROL SULFATE 0.83 MG/ML
2.5 SOLUTION RESPIRATORY (INHALATION) EVERY 4 HOURS PRN
Status: DISCONTINUED | OUTPATIENT
Start: 2023-01-20 | End: 2023-01-20 | Stop reason: HOSPADM

## 2023-01-20 RX ORDER — ASPIRIN 81 MG/1
81 TABLET ORAL DAILY
Status: ON HOLD | COMMUNITY
End: 2023-02-20

## 2023-01-20 RX ORDER — HYDRALAZINE HYDROCHLORIDE 20 MG/ML
2.5-5 INJECTION INTRAMUSCULAR; INTRAVENOUS EVERY 10 MIN PRN
Status: DISCONTINUED | OUTPATIENT
Start: 2023-01-20 | End: 2023-01-20 | Stop reason: HOSPADM

## 2023-01-20 RX ORDER — LABETALOL HYDROCHLORIDE 5 MG/ML
10 INJECTION, SOLUTION INTRAVENOUS
Status: DISCONTINUED | OUTPATIENT
Start: 2023-01-20 | End: 2023-01-20 | Stop reason: HOSPADM

## 2023-01-20 RX ORDER — NALOXONE HYDROCHLORIDE 0.4 MG/ML
0.4 INJECTION, SOLUTION INTRAMUSCULAR; INTRAVENOUS; SUBCUTANEOUS
Status: DISCONTINUED | OUTPATIENT
Start: 2023-01-20 | End: 2023-01-23 | Stop reason: HOSPADM

## 2023-01-20 RX ORDER — ONDANSETRON 2 MG/ML
INJECTION INTRAMUSCULAR; INTRAVENOUS PRN
Status: DISCONTINUED | OUTPATIENT
Start: 2023-01-20 | End: 2023-01-20

## 2023-01-20 RX ORDER — FENTANYL CITRATE 50 UG/ML
INJECTION, SOLUTION INTRAMUSCULAR; INTRAVENOUS PRN
Status: DISCONTINUED | OUTPATIENT
Start: 2023-01-20 | End: 2023-01-20

## 2023-01-20 RX ORDER — CEFAZOLIN SODIUM 1 G/50ML
2000 SOLUTION INTRAVENOUS ONCE
Status: COMPLETED | OUTPATIENT
Start: 2023-01-20 | End: 2023-01-20

## 2023-01-20 RX ORDER — HYDROCODONE BITARTRATE AND ACETAMINOPHEN 5; 325 MG/1; MG/1
1-2 TABLET ORAL EVERY 4 HOURS PRN
Status: DISCONTINUED | OUTPATIENT
Start: 2023-01-20 | End: 2023-01-23 | Stop reason: HOSPADM

## 2023-01-20 RX ORDER — NITROGLYCERIN 0.4 MG/1
0.4 TABLET SUBLINGUAL EVERY 5 MIN PRN
COMMUNITY

## 2023-01-20 RX ORDER — NITROGLYCERIN 0.4 MG/1
0.4 TABLET SUBLINGUAL EVERY 5 MIN PRN
Status: DISCONTINUED | OUTPATIENT
Start: 2023-01-20 | End: 2023-01-23 | Stop reason: HOSPADM

## 2023-01-20 RX ORDER — FENTANYL CITRATE 50 UG/ML
50 INJECTION, SOLUTION INTRAMUSCULAR; INTRAVENOUS EVERY 5 MIN PRN
Status: DISCONTINUED | OUTPATIENT
Start: 2023-01-20 | End: 2023-01-20 | Stop reason: HOSPADM

## 2023-01-20 RX ORDER — SILDENAFIL 100 MG/1
100 TABLET, FILM COATED ORAL PRN
COMMUNITY
Start: 2021-03-29

## 2023-01-20 RX ORDER — HYDROMORPHONE HCL IN WATER/PF 6 MG/30 ML
0.4 PATIENT CONTROLLED ANALGESIA SYRINGE INTRAVENOUS EVERY 5 MIN PRN
Status: DISCONTINUED | OUTPATIENT
Start: 2023-01-20 | End: 2023-01-20 | Stop reason: HOSPADM

## 2023-01-20 RX ADMIN — HYDROCODONE BITARTRATE AND ACETAMINOPHEN 2 TABLET: 5; 325 TABLET ORAL at 21:23

## 2023-01-20 RX ADMIN — LIDOCAINE HYDROCHLORIDE 50 MG: 10 INJECTION, SOLUTION EPIDURAL; INFILTRATION; INTRACAUDAL; PERINEURAL at 17:21

## 2023-01-20 RX ADMIN — SODIUM CHLORIDE: 9 INJECTION, SOLUTION INTRAVENOUS at 21:21

## 2023-01-20 RX ADMIN — PHENYLEPHRINE HYDROCHLORIDE 100 MCG: 10 INJECTION INTRAVENOUS at 17:43

## 2023-01-20 RX ADMIN — MIDAZOLAM 2 MG: 1 INJECTION INTRAMUSCULAR; INTRAVENOUS at 17:16

## 2023-01-20 RX ADMIN — FENTANYL CITRATE 100 MCG: 50 INJECTION, SOLUTION INTRAMUSCULAR; INTRAVENOUS at 17:21

## 2023-01-20 RX ADMIN — SODIUM CHLORIDE, POTASSIUM CHLORIDE, SODIUM LACTATE AND CALCIUM CHLORIDE: 600; 310; 30; 20 INJECTION, SOLUTION INTRAVENOUS at 17:16

## 2023-01-20 RX ADMIN — ONDANSETRON 4 MG: 2 INJECTION INTRAMUSCULAR; INTRAVENOUS at 17:33

## 2023-01-20 RX ADMIN — PHENYLEPHRINE HYDROCHLORIDE 100 MCG: 10 INJECTION INTRAVENOUS at 17:21

## 2023-01-20 RX ADMIN — PHENYLEPHRINE HYDROCHLORIDE 200 MCG: 10 INJECTION INTRAVENOUS at 17:37

## 2023-01-20 RX ADMIN — PHENYLEPHRINE HYDROCHLORIDE 100 MCG: 10 INJECTION INTRAVENOUS at 17:23

## 2023-01-20 RX ADMIN — TAZOBACTAM SODIUM AND PIPERACILLIN SODIUM 3.38 G: 375; 3 INJECTION, SOLUTION INTRAVENOUS at 21:24

## 2023-01-20 RX ADMIN — SODIUM CHLORIDE 1000 ML: 9 INJECTION, SOLUTION INTRAVENOUS at 13:23

## 2023-01-20 RX ADMIN — PHENYLEPHRINE HYDROCHLORIDE 200 MCG: 10 INJECTION INTRAVENOUS at 17:31

## 2023-01-20 RX ADMIN — SODIUM CHLORIDE 1000 ML: 9 INJECTION, SOLUTION INTRAVENOUS at 15:59

## 2023-01-20 RX ADMIN — TAZOBACTAM SODIUM AND PIPERACILLIN SODIUM 3.38 G: 375; 3 INJECTION, SOLUTION INTRAVENOUS at 14:04

## 2023-01-20 RX ADMIN — VANCOMYCIN HYDROCHLORIDE 2000 MG: 1 INJECTION, POWDER, LYOPHILIZED, FOR SOLUTION INTRAVENOUS at 16:33

## 2023-01-20 RX ADMIN — METOPROLOL TARTRATE 75 MG: 50 TABLET, FILM COATED ORAL at 21:23

## 2023-01-20 RX ADMIN — DEXAMETHASONE SODIUM PHOSPHATE 4 MG: 4 INJECTION, SOLUTION INTRA-ARTICULAR; INTRALESIONAL; INTRAMUSCULAR; INTRAVENOUS; SOFT TISSUE at 17:21

## 2023-01-20 RX ADMIN — Medication 1 MG: at 21:23

## 2023-01-20 RX ADMIN — PROPOFOL 150 MG: 10 INJECTION, EMULSION INTRAVENOUS at 17:21

## 2023-01-20 ASSESSMENT — LIFESTYLE VARIABLES: TOBACCO_USE: 1

## 2023-01-20 ASSESSMENT — ACTIVITIES OF DAILY LIVING (ADL)
CONCENTRATING,_REMEMBERING_OR_MAKING_DECISIONS_DIFFICULTY: NO
TOILETING_ISSUES: NO
ADLS_ACUITY_SCORE: 35
ADLS_ACUITY_SCORE: 35
WEAR_GLASSES_OR_BLIND: NO
DIFFICULTY_EATING/SWALLOWING: NO
ADLS_ACUITY_SCORE: 35
ADLS_ACUITY_SCORE: 18
FALL_HISTORY_WITHIN_LAST_SIX_MONTHS: NO
ADLS_ACUITY_SCORE: 35
ADLS_ACUITY_SCORE: 35
WALKING_OR_CLIMBING_STAIRS_DIFFICULTY: NO
CHANGE_IN_FUNCTIONAL_STATUS_SINCE_ONSET_OF_CURRENT_ILLNESS/INJURY: NO
DRESSING/BATHING_DIFFICULTY: NO
DOING_ERRANDS_INDEPENDENTLY_DIFFICULTY: NO

## 2023-01-20 NOTE — ANESTHESIA CARE TRANSFER NOTE
Patient: Roberto Albright    Procedure: Procedure(s):  CYSTOSCOPY, WITH LEFT RETROGRADE PYELOGRAM AND LEFT URETERAL STENT INSERTION       Diagnosis: Kidney stone on left side [N20.0]  Diagnosis Additional Information: No value filed.    Anesthesia Type:   General     Note:    Oropharynx: oropharynx clear of all foreign objects  Level of Consciousness: awake and drowsy  Oxygen Supplementation: face mask  Level of Supplemental Oxygen (L/min / FiO2): 6  Independent Airway: airway patency satisfactory and stable  Dentition: dentition unchanged  Vital Signs Stable: post-procedure vital signs reviewed and stable  Report to RN Given: handoff report given  Patient transferred to: PACU    Handoff Report: Identifed the Patient, Identified the Reponsible Provider, Reviewed the pertinent medical history, Discussed the surgical course, Reviewed Intra-OP anesthesia mangement and issues during anesthesia, Set expectations for post-procedure period and Allowed opportunity for questions and acknowledgement of understanding      Vitals:  Vitals Value Taken Time   /70 01/20/23 1749   Temp     Pulse 86 01/20/23 1751   Resp 27 01/20/23 1751   SpO2 95 % 01/20/23 1751   Vitals shown include unvalidated device data.    Electronically Signed By: WALKER Reeves CRNA  January 20, 2023  5:53 PM

## 2023-01-20 NOTE — PHARMACY-ADMISSION MEDICATION HISTORY
Admission medication history interview status for this patient is complete. See [facility] admission navigator for allergy information, prior to admission medications and immunization status.     Medication history interview done, indicate source(s): Patient  Medication history resources (including written lists, pill bottles, clinic record):None  Pharmacy: -    Changes made to PTA medication list:  Added: aspirin, nitro  Changed: -  Reported as Not Taking: -  Removed: -    Actions taken by pharmacist (provider contacted, etc):None     Additional medication history information:None    Medication reconciliation/reorder completed by provider prior to medication history?  no   (Y/N)     For patients on insulin therapy:   Do you use sliding scale insulin based on blood sugars?   What is your pre-meal insulin coverage?    Do you typically eat three meals a day?   How many times do you check your blood glucose per day?   How many episodes of hypoglycemia do you typically have per month?   Do you have a Continuous Glucose Monitor (CGM)?      Prior to Admission medications    Medication Sig Last Dose Taking? Auth Provider Long Term End Date   acetaminophen (TYLENOL) 500 MG tablet Take 500-1,000 mg by mouth every 6 hours as needed for mild pain  Yes Reported, Patient     aspirin 81 MG EC tablet Take 81 mg by mouth daily 1/20/2023 Yes Unknown, Entered By History     atorvastatin (LIPITOR) 80 MG tablet Take 80 mg by mouth daily 1/20/2023 Yes Reported, Patient Yes    clopidogrel (PLAVIX) 75 MG tablet Take 75 mg by mouth daily 1/20/2023 Yes Reported, Patient Yes    metoprolol tartrate (LOPRESSOR) 50 MG tablet Take 75 mg by mouth 2 times daily 1/20/2023 at x1 Yes Reported, Patient Yes    nitroGLYcerin (NITROSTAT) 0.4 MG sublingual tablet Place 0.4 mg under the tongue every 5 minutes as needed for chest pain For chest pain place 1 tablet under the tongue every 5 minutes for 3 doses. If symptoms persist 5 minutes after 1st dose call 911.   Yes Unknown, Entered By History Yes    HYDROcodone-acetaminophen (NORCO) 5-325 MG tablet Take 1-2 tablets by mouth every 4 hours as needed for moderate to severe pain   Ishan Serrano MD

## 2023-01-20 NOTE — CONSULTS
[facility] Urology Consultation    Roberto Albright MRN# [id number]   Age: 52 year old YOB: 1970     Date of Admission:  1/20/2023    Reason for consult: Sepsis, left renal stone       Requesting physician: Danette       Level of consult: One-time consult to assist in determining a diagnosis and to recommend an appropriate treatment plan           Assessment and Plan:   Assessment:   53 yo M w/ h/o right ureteral stones as well as large left renal pelvis/left UPJ stone s/p right ureteroscopy with laser lithotripsy/stone basketing and bilateral stent placement 1/3/2023 who presents to the hospital with concern for chills, sweats, hypotension, found to have a right ureteral stent in place on imaging and no left ureteral stent, presentation concerning for sepsis due to infected obstructed left renal/UPJ stone, with associated ERIC    I recommend urgent cystoscopy and left ureteral stent placement for decompression of the left renal collecting system. Dr. Norris and the interventional radiology team graciously offered their expertise for urgent percutaneous nephrostomy tube placement. In this case, I would prefer to place a stent and then defer nephrostomy to the primary urologist at time of PCNL to be able to choose optimal access calyx etc. This is a low risk operation but patient has significant risk factors including sepsis, acute kidney injury      Plan:   Continue empiric abx, follow cultures  To OR for urgent cystoscopy with left ureteral stent placement  Herrera catheter to stay in place postop until clinical improvement  Admit postop, for sepsis cares    Silvano Devries MD   [facility] Urology  [phone] clinic phone               Chief Complaint:   Chills, sweats, fever, hypotension; consult placed for concern for sepsis due to urinary source due to obstructing left renal stone     History is obtained from the patient and review of outside records         History of Present Illness:    This patient is a 52 year old M who presents with the following condition requiring a hospital admission:    Urolithiasis    53 yo M w/ h/o right ureteral stones as well as large left renal pelvis/left UPJ stone s/p right ureteroscopy with laser lithotripsy/stone basketing and bilateral stent placement 1/3/2023 who presents to the hospital with concern for chills, sweats, fever, hypotension, found to have a right ureteral stent in place on imaging, no left ureteral stent, concerning for sepsis due to infected obstructed left renal/UPJ stone.    He underwent the aforementioned operation with outside urologist on 1/3/2023 to clear the right ureteral stones and he was prestented on the left side for planned staged left PCNL on 2/20/2023. His stones returned as 100% calcium oxalate.  He was seen in urology clinic 1/11/2023 for planned right ureteral stent removal. The right ureteral stent was documented as removed. He presented to urgent care today with concern for chills and sweats, fever, was found to be hypotensive and sent to the ER.    Workup thus far has noted a marked leukocytosis to 22.2k, ERIC with SCr 2.53, UA with pyuria and few bacteruria. A CT abd/pelvis w/o contrast was performed which showed a right ureteral stent in good position and no left ureteral stent. He was started on zosyn/vanc and urology was consulted            Past Medical History:     I have reviewed this patient's past medical history  Past Medical History:   Diagnosis Date     CAD (coronary artery disease)      Calculus of kidney      Hyperlipidemia      Hypertension              Past Surgical History:     I have reviewed this patient's past surgical history  Past Surgical History:   Procedure Laterality Date     BYPASS GRAFT ARTERY CORONARY       CYSTOURETEROSCOPY, WITH LITHOTRIPSY USING ANIYA 120P LASER AND URETERAL STENT INSERTION Bilateral 1/3/2023    Procedure: CYSTOSCOPY, BILATERAL RETROGRADE PYELOGRAM, RIGHT URETEROSCOPY WITH LASER  LITHOTRIPSY, BILATERAL URETERAL STENT PLACEMENT;  Surgeon: Ishan Serrano MD;  Location: [facility] OR     HERNIA REPAIR Left              Social History:     I have reviewed this patient's social history  Social History     Tobacco Use     Smoking status: Every Day     Smokeless tobacco: Not on file   Substance Use Topics     Alcohol use: No             Family History:   I have reviewed this patient's family history  No family history on file.  Family history not discussed          Immunizations:     Immunization History   Administered Date(s) Administered     TDAP Vaccine (Boostrix) 06/28/2011             Allergies:   No Known Allergies          Medications:     Current Facility-Administered Medications   Medication     [Auto Hold] vancomycin (VANCOCIN) 2,000 mg in sodium chloride 0.9 % 500 mL intermittent infusion             Review of Systems:   A comprehensive review of systems was performed and found to be negative except as described in this note          Physical Exam:   Vitals were reviewed  Temp: 97.5  F (36.4  C) Temp src: Oral BP: 105/66 Pulse: 62   Resp: 18 SpO2: 95 %      Constitutional:   Awake, alert, appears ill but not toxic appearing   Eyes:   No scleral icterus   ENT:   Nc/at   Neck:   No neck mass   Hematologic / Lymphatic:   No bleed/bruise   Back:   deferred   Lungs:   nonlabored on room air   Cardiovascular:   extrem wwp   Abdomen:   nondistended   Chest / Breast:   Not examined   Genitounrinary:   Not examined   Musculoskeletal:   No joint redness or swelling   Neurologic:   BLANCHARD   Neuropsychiatric:   Normal mood and affect   Skin:   No rash             Data:   All laboratory and imaging data in the past 24 hours reviewed  Results for orders placed or performed during the hospital encounter of 01/20/23 (from the past 24 hour(s))   CBC with platelets differential    Narrative    The following orders were created for panel order CBC with platelets differential.  Procedure                                Abnormality         Status                     ---------                               -----------         ------                     CBC with platelets and d...[[id number]]  Abnormal            Final result                 Please view results for these tests on the individual orders.   Comprehensive metabolic panel   Result Value Ref Range    Sodium 133 (L) 136 - 145 mmol/L    Potassium 3.4 3.4 - 5.3 mmol/L    Chloride 96 (L) 98 - 107 mmol/L    Carbon Dioxide (CO2) 22 22 - 29 mmol/L    Anion Gap 15 7 - 15 mmol/L    Urea Nitrogen 39.4 (H) 6.0 - 20.0 mg/dL    Creatinine 2.53 (H) 0.67 - 1.17 mg/dL    Calcium 9.0 8.6 - 10.0 mg/dL    Glucose 110 (H) 70 - 99 mg/dL    Alkaline Phosphatase 152 (H) 40 - 129 U/L    AST 96 (H) 10 - 50 U/L     (H) 10 - 50 U/L    Protein Total 6.7 6.4 - 8.3 g/dL    Albumin 3.5 3.5 - 5.2 g/dL    Bilirubin Total 1.5 (H) <=1.2 mg/dL    GFR Estimate 29 (L) >60 mL/min/1.73m2   Lipase   Result Value Ref Range    Lipase 78 (H) 13 - 60 U/L   Lactic acid whole blood   Result Value Ref Range    Lactic Acid 1.1 0.7 - 2.0 mmol/L   Procalcitonin   Result Value Ref Range    Procalcitonin 2.51 (H) <0.05 ng/mL   CBC with platelets and differential   Result Value Ref Range    WBC Count 22.2 (H) 4.0 - 11.0 10e3/uL    RBC Count 5.31 4.40 - 5.90 10e6/uL    Hemoglobin 14.4 13.3 - 17.7 g/dL    Hematocrit 43.2 40.0 - 53.0 %    MCV 81 78 - 100 fL    MCH 27.1 26.5 - 33.0 pg    MCHC 33.3 31.5 - 36.5 g/dL    RDW 14.1 10.0 - 15.0 %    Platelet Count 233 150 - 450 10e3/uL    % Neutrophils 84 %    % Lymphocytes 6 %    % Monocytes 9 %    % Eosinophils 0 %    % Basophils 0 %    % Immature Granulocytes 1 %    NRBCs per 100 WBC 0 <1 /100    Absolute Neutrophils 18.6 (H) 1.6 - 8.3 10e3/uL    Absolute Lymphocytes 1.2 0.8 - 5.3 10e3/uL    Absolute Monocytes 2.1 (H) 0.0 - 1.3 10e3/uL    Absolute Eosinophils 0.0 0.0 - 0.7 10e3/uL    Absolute Basophils 0.0 0.0 - 0.2 10e3/uL    Absolute Immature Granulocytes 0.3  <=0.4 10e3/uL    Absolute NRBCs 0.0 10e3/uL   Extra Tube    Narrative    The following orders were created for panel order Extra Tube.  Procedure                               Abnormality         Status                     ---------                               -----------         ------                     Extra Green Top (Lithium...[[id number]]                      Final result                 Please view results for these tests on the individual orders.   Extra Green Top (Lithium Heparin) ON ICE   Result Value Ref Range    Hold Specimen JIC    Extra Tube ([place] Draw)    Narrative    The following orders were created for panel order Extra Tube ([place] Draw).  Procedure                               Abnormality         Status                     ---------                               -----------         ------                     Extra Blue Top Tube[[id number]]                              Final result               Extra Red Top Tube[[id number]]                               Final result               Extra Green Top (Lithium...[[id number]]                      Final result                 Please view results for these tests on the individual orders.   Extra Blue Top Tube   Result Value Ref Range    Hold Specimen JIC    Extra Red Top Tube   Result Value Ref Range    Hold Specimen JIC    Extra Green Top (Lithium Heparin) ON ICE   Result Value Ref Range    Hold Specimen JIC    EKG 12-lead, tracing only   Result Value Ref Range    Systolic Blood Pressure  mmHg    Diastolic Blood Pressure  mmHg    Ventricular Rate 68 BPM    Atrial Rate 68 BPM    NH Interval 174 ms    QRS Duration 110 ms     ms    QTc 442 ms    P Axis 59 degrees    R AXIS -9 degrees    T Axis 82 degrees    Interpretation ECG       Sinus rhythm  Nonspecific ST and T wave abnormality  Abnormal ECG  When compared with ECG of 27-JAN-2017 23:24,  Borderline criteria for Inferior infarct are no longer Present  Nonspecific T wave abnormality no longer  evident in Inferior leads  T wave inversion no longer evident in Lateral leads     Symptomatic Influenza A/B & SARS-CoV2 (COVID-19) Virus PCR Multiplex Nasopharyngeal    Specimen: Nasopharyngeal; Swab   Result Value Ref Range    Influenza A PCR Negative Negative    Influenza B PCR Negative Negative    RSV PCR Negative Negative    SARS CoV2 PCR Negative Negative    Narrative    Testing was performed using the Xpert Xpress CoV2/Flu/RSV Assay on the "[employer] GeneXpert Instrument. This test should be ordered for the detection of SARS-CoV-2 and influenza viruses in individuals who meet clinical and/or epidemiological criteria. Test performance is unknown in asymptomatic patients. This test is for in vitro diagnostic use under the FDA EUA for laboratories certified under CLIA to perform high or moderate complexity testing. This test has not been FDA cleared or approved. A negative result does not rule out the presence of PCR inhibitors in the specimen or target RNA in concentration below the limit of detection for the assay. If only one viral target is positive but coinfection with multiple targets is suspected, the sample should be re-tested with another FDA cleared, approved, or authorized test, if coinfection would change clinical management. This test was validated by the [facility]. These laboratories are certified under the Clinical Laboratory Improvement Amendments of 1988 (CLIA-88) as qualified to perform high complexity laboratory testing.   CT Chest Abdomen Pelvis w/o Contrast    Narrative    CT CHEST ABDOMEN PELVIS W/O CONTRAST 1/20/2023 2:44 PM    CLINICAL HISTORY: chills, vomiting, WBC 22, recent bilateral stents  and R lithotripsy, should have residual L stent; Cr too high for  contrast    TECHNIQUE: CT scan of the chest, abdomen, and pelvis was performed  without IV contrast. Multiplanar reformats were obtained. Dose  reduction techniques were used.   CONTRAST: None.    COMPARISON:  1/3/2023    FINDINGS:   LUNGS AND PLEURA: The lungs are clear. No pleural effusion. 3 mm left  lower lobe nodule (series 2, image 188) and 3 mm right upper lobe  nodule (series 3, image 86).    MEDIASTINUM/AXILLAE: 1.7 cm nodule in the thyroid isthmus. No  lymphadenopathy. No thoracic aortic aneurysm. CABG.    HEPATOBILIARY: Hepatic steatosis. Cholelithiasis.    PANCREAS: Normal.    SPLEEN: Normal.    ADRENAL GLANDS: Normal.    KIDNEYS/BLADDER:   Right: A few small residual right renal calculi measuring up to 5 mm.  Right ureteral stent in good position. Mild right  hydroureteronephrosis and perinephric stranding. No mass evident on  noncontrast CT    Left: An 18 mm staghorn calculus in the left renal pelvis and cluster  of nonobstructing calculi at the lower pole of the left kidney  measuring up to 11 mm. Moderate left hydronephrosis with perinephric  stranding. No masses evident on noncontrast CT. No dilatation of the  left ureter.    Urinary bladder: Nearly decompressed. No calculi or surrounding  information.    BOWEL: Diverticulosis in the colon. No acute inflammatory change. No  obstruction. Normal appendix.    LYMPH NODES: Small retroperitoneal lymph nodes, the largest being a  1.3 cm left para-aortic lymph node, nonspecific and likely reactive.    VASCULATURE: No abdominal aortic aneurysm.    PELVIC ORGANS: No pelvic masses.    OTHER: No free fluid or fluid collections. Small fat-containing  umbilical hernia. No free air.    MUSCULOSKELETAL: Degenerative changes in the spine. No suspicious  lesions in the bones.      Impression    IMPRESSION:  1.  Obstructing 18 mm calculus in the LEFT renal pelvis with moderate  left hydronephrosis and perinephric stranding. Additional cluster of  nonobstructing calculi at the lower pole of the left kidney.  2.  Few tiny residual RIGHT renal calculi with a right ureteral stent  in good position. Mild right hydroureteronephrosis.  3.  No other acute findings in the chest,  abdomen and pelvis.  4.  Tiny pulmonary nodules measuring up to 4 mm. See follow quadrants.  5.  Hepatic steatosis.  6.  Cholelithiasis.    REFERENCE:  Guidelines for Management of Incidental Pulmonary Nodules Detected on  CT Images: From the Fleischner Society 2017.   Guidelines apply to incidental nodules in patients who are 35 years or  older.  Guidelines do not apply to lung cancer screening, patients with  immunosuppression, or patients with known primary cancer.    MULTIPLE NODULES  Nodule size <6 mm  Low-risk patients: No follow-up needed.  High-risk patients: Optional follow-up at 12 months.    JUDD SNOWDEN MD         SYSTEM ID:  [id number]   UA with Microscopic   Result Value Ref Range    Color Urine Yellow Colorless, Straw, Light Yellow, Yellow    Appearance Urine Slightly Cloudy (A) Clear    Glucose Urine Negative Negative mg/dL    Bilirubin Urine Negative Negative    Ketones Urine Negative Negative mg/dL    Specific Gravity Urine 1.021 1.003 - 1.035    Blood Urine Large (A) Negative    pH Urine 5.5 5.0 - 7.0    Protein Albumin Urine 70 (A) Negative mg/dL    Urobilinogen Urine Normal Normal, 2.0 mg/dL    Nitrite Urine Negative Negative    Leukocyte Esterase Urine Large (A) Negative    Bacteria Urine Few (A) None Seen /HPF    WBC Clumps Urine Present (A) None Seen /HPF    Mucus Urine Present (A) None Seen /LPF    RBC Urine >182 (H) <=2 /HPF    WBC Urine >182 (H) <=5 /HPF    Squamous Epithelials Urine 1 <=1 /HPF    Hyaline Casts Urine 74 (H) <=2 /LPF     All imaging studies reviewed by me.      My interpretation: Right ureteral stent in good position with coil in the renal pelvis and in the bladder. There are no obvious right ureteral stones. No left ureteral stent is present  There is a large left renal pelvis/left UPJ stone with mild hydronephrosis. Also additional lower pole stone fragments       Attestation:  I have reviewed today's vital signs, notes, medications, labs and imaging.  Amount of time  performed on this consult: 45 minutes.    Silvano Devries MD

## 2023-01-20 NOTE — ED NOTES
Bed: ED03  Expected date: 1/20/23  Expected time: 12:46 PM  Means of arrival: Ambulance  Comments:  Alfred [address]  52M  Hypotensive/Kidney stone

## 2023-01-20 NOTE — OP NOTE
[facility]  Operative Note    Pre-operative diagnosis: Kidney stone on left side [N20.0]   Post-operative diagnosis Kidney stone on left side [N20.0]  Urethral meatal stenosis   Procedure: Procedure(s):  CYSTOSCOPY, WITH LEFT RETROGRADE PYELOGRAM AND LEFT URETERAL STENT INSERTION   FLUOROSCOPIC INTERPRETATION <1 HOUR PHYSICIAN TIME   Surgeon: Silvano Devries MD   Assistants(s): NONE   Anesthesia: General    Estimated blood loss: None    Total IV fluids: (See anesthesia record)   Blood transfusion: No transfusion was given during surgery   Total urine output: Not measured   Drains: 16 Fr Coude Herrera catheter  6 Fr x 28 cm left ureteral stent (placed during this operation)  6 Fr x 28 cm right ureteral stent (preexisting from prior operation 1/3/2023)   Specimens: ID Type Source Tests Collected by Time Destination   A : BLADDER URINE FOR CULTURE  Urine Urinary Bladder URINE CULTURE Silvano Devries MD 1/20/2023  5:40 PM         Implants: Implant Name Type Inv. Item Serial No.  Lot No. LRB No. Used Action   STENT URETERAL POLARIS ULTRA [id number] - [id number] Stent STENT URETERAL POLARIS ULTRA [id number]  [employer] [id number] Left 1 Implanted          Findings:   Tight urethral meatus, used 20 Fr rigid scope instead of 22 Fr  Preexisting right ureteral stent without stone encrustation. No left ureteral stent present  Large shadowing left renal stone on  film  Mild left hydronephrosis on retrograde pyelogram  Left ureteral stent placed with proximal curl in the upper pole, with return of cloudy effluent.  Urine sent for culture     Complications: None.   Condition: Stable               Description of procedure:  53 yo M w/ h/o right ureteral stones as well as large left renal pelvis/left UPJ stone s/p right ureteroscopy with laser lithotripsy/stone basketing and bilateral stent placement 1/3/2023 who presents to the hospital with concern for chills,  sweats, hypotension, found to have a right ureteral stent in place on imaging and no left ureteral stent, presentation concerning for sepsis due to infected obstructed left renal/UPJ stone, with associated ERIC. I recommended urgent cystoscopy and left ureteral stent placement for decompression of the left renal collecting system. Risks including transient worsening of infection, stent pain and irritation, hematuria were discussed. Informed consent was obtained    The patient was brought operating room #14.  He had received Zosyn antibiotics in the ER and was in the process of receiving vancomycin antibiotics at the time of the procedure.  General anesthesia was induced, he was placed in dorsal lithotomy position, prepped and draped in standard sterile fashion.  Timeout was performed.    A 22 Cypriot Storz cystoscope was assembled and attempted to be passed into the urethra however the urethral meatus was too tight.  I switched over to a 20 Cypriot sheath instead which was able to pass easily through the meatus.  The remainder of the anterior urethra was normal.  The prostatic urethra was short and nonobstructive.  The bladder was entered.  There was a ureteral stent emanating from the right ureteral orifice with no stone encrustation.  The left ureteral orifice did not have a stent present.   film showed the presence of a right ureteral stent in good position.  There was a large shadowing radiodensity over the left kidney consistent with CT imaging.  The left ureteral orifice was cannulated with a 5 Cypriot tiger tail catheter.  A gentle retrograde pyelogram showed mild left hydronephrosis.  A 0.035 inch stiff shaft Glidewire was passed up to the renal pelvis under fluoroscopic guidance and the tiger tail catheter was removed.  A 6 Cypriot by 28 cm [place] [employer] Polaris Ultra stent was then placed in the usual fashion under cystoscopic and fluoroscopic guidance with good coils noted proximally and distally.   There was immediate return of cloudy effluent after stent placement.  Urine was collected and sent for culture.  The scope was removed and a Herrera catheter was placed and left to gravity drainage.  Patient was cleaned up, awoken from anesthesia and brought to PACU in stable condition.  He will remain admitted on IV antibiotics. Herrera catheter to remain in place until clinical improvement    Silvano Devries MD   [facility] Urology  [phone] clinic phone

## 2023-01-20 NOTE — H&P
[facility]    History and Physical - Hospitalist Service       Date of Admission:  1/20/2023    Assessment & Plan      Roberto Albright is a 52 year old male admitted on 1/20/2023.    Sepsis due to acute pyelonephritis UTI  We will continue Zosyn that was started in the emergency department  Vancomycin that was started in the emergency department is not being given given the acute kidney injury -this can be added if there is gram-positive cocci seen in the urine culture or blood culture  Blood pressure is stable we will continue IV fluids  Awaiting blood cultures and urine culture results      Bilateral urolithiasis  Right-sided and left-sided hydronephrosis  Management per urology    Acute kidney injury  Probably on the basis of urinary obstruction as well as some due to sepsis  Will monitor creatinine  Give IV fluids  BMP in a.m.    Coronary artery disease  We will continue aspirin 81 mg daily, Plavix 75 mg daily, metoprolol 75 mg twice daily    Hypertension  On metoprolol tartrate 75 mg twice daily which will be continued    Hypercholesteremia  We will continue the 80 mg atorvastatin daily -liver functions are not more than 5 times normal    Cholelithiasis  Hepatic steatosis  Abnormal liver function  Patient does not have any right upper quadrant tenderness or Eisenberg sign  Need to closely monitor liver functions    Recent history of acute diverticulitis    Smoking  I emphasized smoking cessation  We will give patient nicotine gum in the hospital      Diet:  Cardiac diet  DVT Prophylaxis: Pneumatic Compression Devices  Herrera Catheter: Not present  Lines: None     Cardiac Monitoring: None  Code Status:  Full code    Clinically Significant Risk Factors Present on Admission                # Drug Induced Platelet Defect: home medication list includes an antiplatelet medication        # Obesity: Estimated body mass index is 32.14 kg/m  as calculated from the following:    Height as of this  encounter: 1.829 m (6').    Weight as of this encounter: 107.5 kg (237 lb).           Disposition Plan   Patient is expected to be in the hospital for between 2 and 3 days    Lai Tatum MD  Hospitalist Service  [facility]  Securely message with [email or web address] (more info)  Text page via [facility] Paging/Directory     ______________________________________________________________________    Chief Complaint   Hypotension    History is obtained from the patient, medical records and my discussion with emergency department physician      History of Present Illness   Roberto Albright is a 52 year old gentleman with CAD, HTN, Hypercholesteremia and Kidney calculus  who On 12/1/2022 he was found to have on CT Abdomen and Pelvis   1.  Cluster of at least 6 stones measuring up to 4 mm is noted within the distal right ureter approximately 3 cm from the right ureterovesicular junction and 2 additional stones measuring up to 6 mm approximately 2 cm from the right UVJ. This is causing moderate right hydronephrosis and hydroureter. Few additional small nonobstructive stones are noted within the right kidney.  Large stone at the left ureteropelvic junction measuring 1.5 cm which is causing mild left hydronephrosis. Several smaller stones are noted within the inferior pole of the left kidney. Recommend urological consultation.   2.  Small area of mild acute diverticulitis in the proximal transverse colon with no evidence of abscess or perforation     he tells me that on January 3 he had stents put in both his ureters and then the left-sided stent was pulled out according to the patient on January 11 or 12.  He was supposed to have another surgery/nephrostomy?  Done on February 20, 2023.    For the last 1 week he has had nausea vomiting along with fever of 102  F that lasted for 1 day about 6 days ago.  His nausea and vomiting lasted 2 days.  He was having on and off feverish feeling.  Today he was having sweating with  warmth feeling with cold and shivering alternating with it because of which she went to urgent care where his blood pressure was found to be 80/40 and he was referred to the [facility].    Here he was found to have greater than 182 WBCs and RBCs in his urine with large leukocyte esterase and blood in his urine along with proteinuria.  His WBC count was found to be 22.2 hemoglobin 14 platelet count 233,000  Sodium 133 potassium 3.4 chloride 96 bicarb 22 BUN 39 creatinine 2.53 creatinine was 1 on 12/31/2020  Alkaline phosphatase 152  AST 96 total bilirubin 1.5 glucose 110 lipase 78 and procalcitonin was positive at 2.51  CT scan of the chest abdomen and pelvis showed obstructing 18 mm calculus in the left renal pelvis with moderate left hydronephrosis along with perinephric stranding and additional cluster of nonobstructing calculi at the lower pole of the left kidney.  Few tiny distal right renal calculi with right ureteral stent in good position was seen with mild right hydroureteronephrosis.  Hepatic steatosis was seen along with cholelithiasis.    Patient was taken to the operating room from the emergency department by Dr. Devries urologist who did cystoscopy with left retrograde pyelogram and left ureteral stent insertion following which I saw the patient on the floor.        Past Medical History    Past Medical History:   Diagnosis Date     CAD (coronary artery disease)      Calculus of kidney      Hyperlipidemia      Hypertension        Past Surgical History   Past Surgical History:   Procedure Laterality Date     BYPASS GRAFT ARTERY CORONARY       CYSTOURETEROSCOPY, WITH LITHOTRIPSY USING ANIYA 120P LASER AND URETERAL STENT INSERTION Bilateral 1/3/2023    Procedure: CYSTOSCOPY, BILATERAL RETROGRADE PYELOGRAM, RIGHT URETEROSCOPY WITH LASER LITHOTRIPSY, BILATERAL URETERAL STENT PLACEMENT;  Surgeon: Ishan Serrano MD;  [facility] OR     HERNIA REPAIR Left        Prior to  Admission Medications   Prior to Admission Medications   Prescriptions Last Dose Informant Patient Reported? Taking?   HYDROcodone-acetaminophen (NORCO) 5-325 MG tablet   No No   Sig: Take 1-2 tablets by mouth every 4 hours as needed for moderate to severe pain   acetaminophen (TYLENOL) 500 MG tablet   Yes Yes   Sig: Take 500-1,000 mg by mouth every 6 hours as needed for mild pain   aspirin 81 MG EC tablet 1/20/2023  Yes Yes   Sig: Take 81 mg by mouth daily   atorvastatin (LIPITOR) 80 MG tablet 1/20/2023  Yes Yes   Sig: Take 80 mg by mouth daily   clopidogrel (PLAVIX) 75 MG tablet 1/20/2023  Yes Yes   Sig: Take 75 mg by mouth daily   metoprolol tartrate (LOPRESSOR) 50 MG tablet 1/20/2023 at x1  Yes Yes   Sig: Take 75 mg by mouth 2 times daily   nitroGLYcerin (NITROSTAT) 0.4 MG sublingual tablet   Yes Yes   Sig: Place 0.4 mg under the tongue every 5 minutes as needed for chest pain For chest pain place 1 tablet under the tongue every 5 minutes for 3 doses. If symptoms persist 5 minutes after 1st dose call 911.      Facility-Administered Medications: None        Review of Systems    Denies any headache, blurring of vision or double vision, neck pain jaw pain or shoulder pain, chest pain, shortness of breath, cough or increased sputum production, nausea or vomiting, abdominal pain, diarrhea or constipation.  Denies any urinary symptoms of burning or increased frequency. Denies any weakness of upper or lower extremities or any seizure activity. Bleeding from anywhere else.  No rashes or cellulitis.      Social History   I have reviewed this patient's social history and updated it with pertinent information if needed.  Social History     Tobacco Use     Smoking status: Every Day   Substance Use Topics     Alcohol use: No     Drug use: No       Family History   2 of patient's brothers have kidney stones    Allergies   No Known Allergies     Physical Exam   Vital Signs: Temp: 97.5  F (36.4  C) Temp src: Oral BP: 105/66  Pulse: 62   Resp: 18 SpO2: 95 %      Weight: 237 lbs 0 oz      GENERAL: Patient does not look in any acute distress  HEENT: EOM+, Conjunctiva is clear   NECK:no  Jugular Venous distention  HEART: S1 S2 regular  Rate and Rhythm,  no murmur,    LUNGS: Respirations are not laboured, Lungs are clear to auscultation Crepitations or Wheezing   ABDOMEN: Soft, there is no tenderness , Bowel Sounds are Positive   LOWER LIMBS:  No Pedal Edema  Bilaterally   CNS:  Alert,  Oriented x 3, Moving all the Four Limbs         Data   Imaging results reviewed over the past 24 hrs:   Recent Results (from the past 24 hour(s))   CT Chest Abdomen Pelvis w/o Contrast    Narrative    CT CHEST ABDOMEN PELVIS W/O CONTRAST 1/20/2023 2:44 PM    CLINICAL HISTORY: chills, vomiting, WBC 22, recent bilateral stents  and R lithotripsy, should have residual L stent; Cr too high for  contrast    TECHNIQUE: CT scan of the chest, abdomen, and pelvis was performed  without IV contrast. Multiplanar reformats were obtained. Dose  reduction techniques were used.   CONTRAST: None.    COMPARISON: 1/3/2023    FINDINGS:   LUNGS AND PLEURA: The lungs are clear. No pleural effusion. 3 mm left  lower lobe nodule (series 2, image 188) and 3 mm right upper lobe  nodule (series 3, image 86).    MEDIASTINUM/AXILLAE: 1.7 cm nodule in the thyroid isthmus. No  lymphadenopathy. No thoracic aortic aneurysm. CABG.    HEPATOBILIARY: Hepatic steatosis. Cholelithiasis.    PANCREAS: Normal.    SPLEEN: Normal.    ADRENAL GLANDS: Normal.    KIDNEYS/BLADDER:   Right: A few small residual right renal calculi measuring up to 5 mm.  Right ureteral stent in good position. Mild right  hydroureteronephrosis and perinephric stranding. No mass evident on  noncontrast CT    Left: An 18 mm staghorn calculus in the left renal pelvis and cluster  of nonobstructing calculi at the lower pole of the left kidney  measuring up to 11 mm. Moderate left hydronephrosis with perinephric  stranding. No  masses evident on noncontrast CT. No dilatation of the  left ureter.    Urinary bladder: Nearly decompressed. No calculi or surrounding  information.    BOWEL: Diverticulosis in the colon. No acute inflammatory change. No  obstruction. Normal appendix.    LYMPH NODES: Small retroperitoneal lymph nodes, the largest being a  1.3 cm left para-aortic lymph node, nonspecific and likely reactive.    VASCULATURE: No abdominal aortic aneurysm.    PELVIC ORGANS: No pelvic masses.    OTHER: No free fluid or fluid collections. Small fat-containing  umbilical hernia. No free air.    MUSCULOSKELETAL: Degenerative changes in the spine. No suspicious  lesions in the bones.      Impression    IMPRESSION:  1.  Obstructing 18 mm calculus in the LEFT renal pelvis with moderate  left hydronephrosis and perinephric stranding. Additional cluster of  nonobstructing calculi at the lower pole of the left kidney.  2.  Few tiny residual RIGHT renal calculi with a right ureteral stent  in good position. Mild right hydroureteronephrosis.  3.  No other acute findings in the chest, abdomen and pelvis.  4.  Tiny pulmonary nodules measuring up to 4 mm. See follow quadrants.  5.  Hepatic steatosis.  6.  Cholelithiasis.    REFERENCE:  Guidelines for Management of Incidental Pulmonary Nodules Detected on  CT Images: From the Fleischner Society 2017.   Guidelines apply to incidental nodules in patients who are 35 years or  older.  Guidelines do not apply to lung cancer screening, patients with  immunosuppression, or patients with known primary cancer.    MULTIPLE NODULES  Nodule size <6 mm  Low-risk patients: No follow-up needed.  High-risk patients: Optional follow-up at 12 months.    JUDD SNOWDEN MD         SYSTEM ID:  [id number]     Most Recent 3 CBC's:Recent Labs   Lab Test 01/20/23  1259 01/27/17  2335   WBC 22.2* 11.9*   HGB 14.4 17.2   MCV 81 81    256     Most Recent 3 BMP's:Recent Labs   Lab Test 01/20/23  1259 12/31/20  0000  01/27/17  2335   *  --  141   POTASSIUM 3.4 3.9 3.6   CHLORIDE 96*  --  109   CO2 22  --  25   BUN 39.4*  --  15   CR 2.53* 1.00 0.78   ANIONGAP 15  --  7   URIEL 9.0  --  8.2*   * 116* 90     Most Recent 2 LFT's:Recent Labs   Lab Test 01/20/23  1259   AST 96*   *   ALKPHOS 152*   BILITOTAL 1.5*     Most Recent Urinalysis:Recent Labs   Lab Test 01/20/23  1559   COLOR Yellow   APPEARANCE Slightly Cloudy*   URINEGLC Negative   URINEBILI Negative   URINEKETONE Negative   SG 1.021   UBLD Large*   URINEPH 5.5   PROTEIN 70*   NITRITE Negative   LEUKEST Large*   RBCU >182*   WBCU >182*

## 2023-01-20 NOTE — ED TRIAGE NOTES
Triage Assessment     Row Name 01/20/23 1317       Triage Assessment (Adult)    Airway WDL WDL       Respiratory WDL    Respiratory WDL WDL       Skin Circulation/Temperature WDL    Skin Circulation/Temperature WDL WDL       Cardiac WDL    Cardiac WDL WDL       Cognitive/Neuro/Behavioral WDL    Cognitive/Neuro/Behavioral WDL WDL

## 2023-01-20 NOTE — ED PROVIDER NOTES
History   Chief Complaint:  Weakness and chills       The history is provided by the patient (supplemented by electronic chart review).      Roberto Albright is a 52 year old male who presents by EMS with weakness, chills and hypotension.  Mike was in-clinic today and was found to be hypotensive with pressures in the 80s/50s.  EMS rechecked his pressure in the rig at 88 systolic.  Patient notes his clinical visit was due to symptoms beginning 5 days ago which include vomiting (which improved 3 days ago, and included 6 total episodes), diaphoresis, feverishness and chills. He has had two recent stones, one of which required a lithotripsy and another that is going to require a surgical procedure in the near future. Has not had a bowel movement in 2 days. Denies dizziness. His Urologist is Dr. Serrano.  Patient denies any flank or abdominal pain at this time.  He has occasionally seen some blood in his urine.  He does not have a sensation of urinary retention.     Review of External Notes: I reviewed CT A/P from 12/1/22, results as below:  1.  Cluster of at least 6 stones measuring up to 4 mm is noted within the distal right ureter approximately 3 cm from the right ureterovesicular junction and 2 additional stones measuring up to 6 mm approximately 2 cm from the right UVJ. This is causing moderate right hydronephrosis and hydroureter. Few additional small nonobstructive stones are noted within the right kidney.  Large stone at the left ureteropelvic junction measuring 1.5 cm which is causing mild left hydronephrosis. Several smaller stones are noted within the inferior pole of the left kidney. Recommend urological consultation.   2.  Small area of mild acute diverticulitis in the proximal transverse colon with no evidence of abscess or perforation.    I performed electronic chart review, including Care Everywhere, and see that on January 3 of this year he underwent right-sided lithotripsy as well as bilateral stent  placement by Dr. Serrano, Urologist, at [facility].    I reviewed prior labs, most recent creatinine available to me was 10 months ago, it was 0.99 at that time.    ROS:  Review of Systems   All other systems reviewed and are negative.    Allergies:  No Known Allergies     Medications:    atorvastatin   HYDROcodone-acetaminophen   metoprolol tartrate     Past Medical History:    CAD  Kidney stone  Hypertension  Hyperlipidemia  Stent - coronary artery  Coronary artery bypass  Tobacco use     Past Surgical History:    CA bypass  Hernia repair  Cystoureteroscopy w/ lithotripsy     Social History:  Presents alone via EMS   [facility]P: [facility]     Physical Exam     Patient Vitals for the past 24 hrs:   BP Temp Temp src Pulse Resp SpO2 Height Weight   01/20/23 1657 127/80 97.3  F (36.3  C) Temporal 76 16 100 % -- --   01/20/23 1630 (!) 116/94 -- -- 81 -- -- -- --   01/20/23 1615 103/67 -- -- 70 -- -- -- --   01/20/23 1545 106/65 -- -- 64 -- 98 % -- --   01/20/23 1500 105/66 -- -- 62 18 95 % -- --   01/20/23 1345 100/60 -- -- 62 -- -- -- --   01/20/23 1330 98/60 -- -- 62 -- -- -- --   01/20/23 1319 -- 97.5  F (36.4  C) Oral -- -- -- 1.829 m (6') 107.5 kg (237 lb)   01/20/23 1310 93/77 -- -- 71 -- -- -- --      Physical Exam  General: Male sitting upright in room 3, sister and girlfriend later at bedside  HENT: mucous membranes slightly dry  CV: rate as above, regular rhythm, no LE edema, no murmur audible  Resp: normal effort, speaks in full phrases, no stridor, no cough observed  GI: abdomen soft and nontender, no guarding, no palpable masses  MSK:   Thoracic spine: nontender, +L CVAT, no R CVAT  Lumbar spine: nontender  Pelvis stable.  : deferred  Skin: appropriately warm and dry, no erythema/ecchymosis/vesicles to back  Neuro: alert, clear speech, oriented  Psych: cooperative, no apparent hallucinations    Emergency Department Course   ECG:  ECG results from 01/20/23   EKG 12-lead, tracing only      Value    Systolic Blood Pressure     Diastolic Blood Pressure     Ventricular Rate 68    Atrial Rate 68    AK Interval 174    QRS Duration 110        QTc 442    P Axis 59    R AXIS -9    T Axis 82    Interpretation ECG      Sinus rhythm  Nonspecific ST and T wave abnormality  When compared with ECG of 27-JAN-2017 23:24,  Borderline criteria for Inferior infarct are no longer Present  Nonspecific T wave abnormality no longer evident in Inferior leads  T wave inversion no longer evident in Lateral leads       Imaging:  CT Chest Abdomen Pelvis w/o Contrast   Final Result   IMPRESSION:   1.  Obstructing 18 mm calculus in the LEFT renal pelvis with moderate   left hydronephrosis and perinephric stranding. Additional cluster of   nonobstructing calculi at the lower pole of the left kidney.   2.  Few tiny residual RIGHT renal calculi with a right ureteral stent   in good position. Mild right hydroureteronephrosis.   3.  No other acute findings in the chest, abdomen and pelvis.   4.  Tiny pulmonary nodules measuring up to 4 mm. See follow quadrants.   5.  Hepatic steatosis.   6.  Cholelithiasis.      REFERENCE:   Guidelines for Management of Incidental Pulmonary Nodules Detected on   CT Images: From the Fleischner Society 2017.    Guidelines apply to incidental nodules in patients who are 35 years or   older.   Guidelines do not apply to lung cancer screening, patients with   immunosuppression, or patients with known primary cancer.      MULTIPLE NODULES   Nodule size <6 mm   Low-risk patients: No follow-up needed.   High-risk patients: Optional follow-up at 12 months.      JUDD SNOWDEN MD            SYSTEM ID:  [id number]      XR Surgery MARIE L/T 5 Min Fluoro w Stills    (Results Pending)      Report per radiology    Laboratory:  Labs Ordered and Resulted from Time of ED Arrival to Time of ED Departure   COMPREHENSIVE METABOLIC PANEL - Abnormal       Result Value    Sodium 133 (*)     Potassium 3.4      Chloride 96  (*)     Carbon Dioxide (CO2) 22      Anion Gap 15      Urea Nitrogen 39.4 (*)     Creatinine 2.53 (*)     Calcium 9.0      Glucose 110 (*)     Alkaline Phosphatase 152 (*)     AST 96 (*)      (*)     Protein Total 6.7      Albumin 3.5      Bilirubin Total 1.5 (*)     GFR Estimate 29 (*)    LIPASE - Abnormal    Lipase 78 (*)    PROCALCITONIN - Abnormal    Procalcitonin 2.51 (*)    ROUTINE UA WITH MICROSCOPIC - Abnormal    Color Urine Yellow      Appearance Urine Slightly Cloudy (*)     Glucose Urine Negative      Bilirubin Urine Negative      Ketones Urine Negative      Specific Gravity Urine 1.021      Blood Urine Large (*)     pH Urine 5.5      Protein Albumin Urine 70 (*)     Urobilinogen Urine Normal      Nitrite Urine Negative      Leukocyte Esterase Urine Large (*)     Bacteria Urine Few (*)     WBC Clumps Urine Present (*)     Mucus Urine Present (*)     RBC Urine >182 (*)     WBC Urine >182 (*)     Squamous Epithelials Urine 1      Hyaline Casts Urine 74 (*)    CBC WITH PLATELETS AND DIFFERENTIAL - Abnormal    WBC Count 22.2 (*)     RBC Count 5.31      Hemoglobin 14.4      Hematocrit 43.2      MCV 81      MCH 27.1      MCHC 33.3      RDW 14.1      Platelet Count 233      % Neutrophils 84      % Lymphocytes 6      % Monocytes 9      % Eosinophils 0      % Basophils 0      % Immature Granulocytes 1      NRBCs per 100 WBC 0      Absolute Neutrophils 18.6 (*)     Absolute Lymphocytes 1.2      Absolute Monocytes 2.1 (*)     Absolute Eosinophils 0.0      Absolute Basophils 0.0      Absolute Immature Granulocytes 0.3      Absolute NRBCs 0.0     LACTIC ACID WHOLE BLOOD - Normal    Lactic Acid 1.1     INFLUENZA A/B & SARS-COV2 PCR MULTIPLEX - Normal    Influenza A PCR Negative      Influenza B PCR Negative      RSV PCR Negative      SARS CoV2 PCR Negative     BLOOD CULTURE   BLOOD CULTURE        Emergency Department Course & Assessments:    Interventions:  Medications   Patient RECEIVING antibiotic to treat a  different condition and it provides ADEQUATE COVERAGE for this surgical procedure. (has no administration in time range)   lactated ringers infusion (has no administration in time range)   fentaNYL (PF) (SUBLIMAZE) injection 25 mcg (has no administration in time range)   fentaNYL (PF) (SUBLIMAZE) injection 50 mcg (has no administration in time range)   HYDROmorphone (DILAUDID) injection 0.2 mg (has no administration in time range)   HYDROmorphone (DILAUDID) injection 0.4 mg (has no administration in time range)   hydrALAZINE (APRESOLINE) injection 2.5-5 mg (has no administration in time range)   labetalol (NORMODYNE/TRANDATE) injection 10 mg (has no administration in time range)   albuterol (PROVENTIL) neb solution 2.5 mg (has no administration in time range)   racEPINEPHrine neb solution 0.5 mL (has no administration in time range)   ondansetron (ZOFRAN ODT) ODT tab 4 mg (has no administration in time range)     Or   ondansetron (ZOFRAN) injection 4 mg (has no administration in time range)   prochlorperazine (COMPAZINE) injection 5 mg (has no administration in time range)   naloxone (NARCAN) injection 0.2 mg (has no administration in time range)     Or   naloxone (NARCAN) injection 0.4 mg (has no administration in time range)     Or   naloxone (NARCAN) injection 0.2 mg (has no administration in time range)     Or   naloxone (NARCAN) injection 0.4 mg (has no administration in time range)   0.9% sodium chloride BOLUS (0 mLs Intravenous Stopped 1/20/23 1406)   piperacillin-tazobactam (ZOSYN) infusion 3.375 g (0 g Intravenous Stopped 1/20/23 1531)   vancomycin (VANCOCIN) 2,000 mg in sodium chloride 0.9 % 500 mL intermittent infusion (0 mg Intravenous ED Infusing on Admission/transfer 1/20/23 1639)   0.9% sodium chloride BOLUS (0 mLs Intravenous Stopped 1/20/23 1639)      Independent Interpretation (X-rays, CTs, rhythm strip):  I personally reviewed his CT imaging, noting a right-sided ureteral stents as well as some  left-sided hydronephrosis and a proximal left-sided stone in the genitourinary tract.    Consultations/Discussion of Management or Tests:  ED Course as of 01/20/23 1727   Fri Jan 20, 2023   1247 I obtained history and examined the patient.    1351 Time 0 for sepsis via lab results   1407 Rechecked and updated.   1549 I spoke with Dr. Norris, IR, regarding potential nephrostomy tube.  He will contact the rest of his team, I will call the urologist covering [facility] to discuss.   1557 I spoke with Dr. Norris again.   1602 I spoke with Dr. Silvano Devries, Urologist, who has reviewed images.  We discussed the situation, Dr. Devries recommends taking the patient to the operating room for left-sided ureteral stenting to decompress his genitourinary tract.  He will call the OR now.   1607 I spoke with Dr. Norris again, will defer nephrostomy tube for now.   1640 I spoke with Dr. RADHA Tatum, [facility] Hospitalist, who accepts care.     Social Determinants of Health affecting care:  N/A    Disposition:  The patient was admitted to the hospital under the care of Dr. RADHA Tatum.     Impression & Plan      CMS Diagnoses:   The patient has signs of Severe Sepsis        If one the following conditions is present, a 30 mL/kg bolus is recommended as part of the 6 hour bundle (IBW can be used for BMI >30, or document refusal/contraindication):      1.   Initial hypotension  defined as 2 bps < 90 or map < 65 in the 6hrs before or 3hrs after time zero.     2.  Lactate >4.      The patient has signs of Severe Sepsis as evidenced by:    1. 2 SIRS criteria, AND  2. Suspected infection, AND   3. Organ dysfunction: Cr 2.5    Time severe sepsis diagnosis confirmed: 1354  01/20/23 as this was the time when Labs resulted, which revealed WBC 22.2 accompanied by hypotension.     3 Hour Severe Sepsis Bundle Completion:    1. Initial Lactic Acid Result:   Recent Labs   Lab Test 01/20/23  1259   LACT 1.1     2. Blood Cultures before Antibiotics: Yes  3.  "Broad Spectrum Antibiotics Administered:  yes       Anti-infectives (From admission through now)    Start     Dose/Rate Route Frequency Ordered Stop    01/20/23 1600  [Auto Hold]  vancomycin (VANCOCIN) 2,000 mg in sodium chloride 0.9 % 500 mL intermittent infusion        (Auto Hold since Fri 1/20/2023 at 1638.Hold Reason: Transfer to a procedural area)   Note to Pharmacy: Dose increased to ~ 18 mg/kg    2,000 mg  over 2 Hours Intravenous ONCE 01/20/23 1538 01/20/23 1639    01/20/23 1355  piperacillin-tazobactam (ZOSYN) infusion 3.375 g        Note to Pharmacy: For [facility], [facility] and [facility]: For Zosyn-naive patients, use the \"Zosyn initial dose + extended infusion\" order panel.    3.375 g  100 mL/hr over 30 Minutes Intravenous ONCE 01/20/23 1352 01/20/23 1531          4. Is initial hypotension present?     Yes. (Definition - 2 SBPs <90, MAP <65, or decrease > 40 from baseline due to infection w/in 3 hrs of each other during the time period of 6 hrs before and 3 hrs after time zero)   Full 30 mL/kg bolus intentionally NOT administered to this patient due to blood pressure responded to a lesser amount of fluid and plan is to administer 2000 ml of IV fluids    BMI Readings from Last 1 Encounters:   01/20/23 32.14 kg/m      30 mL/kg fluids based on weight: 3,230 mL  30 mL/kg fluids based on IBW (must be >= 60 inches tall): 2,330 mL                Severe Sepsis reassessment:  1. Repeat Lactic Acid Level within 6 hours of time zero: pending  2. MAP>65 after initial IVF bolus, will continue to monitor fluid status and vital signs    I attest to having performed a repeat sepsis exam and assessment of perfusion at 1620 and the results demonstrate improved perfusion.     Medical Decision Making:  Patient has evidence of severe sepsis with prehospital hypotension on multiple readings, leukocytosis, and a creatinine of 2.5 which is presumed new, albeit there are no recent values to compare to.  He has chills, feels feverish at home and " last few days, vomiting, and recently had a urologic procedure, with CT findings of perinephric stranding and hydronephrosis from presumed suboptimal decompression of the left side of his urinary tract.  I spoke with both the interventional radiologist as well as the [facility] urologist, and ultimately a plan was arranged to take him to the operating room for left-sided ureteral stent placement.  I am aware that the patient's urologist, Dr. Serrano, does not have privileges here at this hospital, and I consider transfer though based on my other experiences during this clinical shift there are simply no beds for transfer to outside hospitals or elsewhere in the system including [facility], so I felt that it was in his best interest to stay here and receive the necessary medical and procedural interventions to stabilize his condition by our very capable local specialists.  I notified the patient of these findings and plan of care.  Blood pressure improved with fluids alone, empiric broad-spectrum antibiotics were initiated, no persistent hypotension and therefore no need for pressors at this time.  No peritonitis.  He will be admitted to the hospital after his procedure for ongoing care.    Diagnosis:    ICD-10-CM    1. Severe sepsis (H)  A41.9     R65.20       2. Kidney stone on left side  N20.0 Case Request: CYSTOSCOPY, WITH LEFT RETROGRADE PYELOGRAM AND LEFT URETERAL STENT INSERTION     Case Request: CYSTOSCOPY, WITH LEFT RETROGRADE PYELOGRAM AND LEFT URETERAL STENT INSERTION      3. Pyelonephritis, acute  N10     highly suspected      4. Kidney stone  N20.0          Critical Care Time:  47 minutes, independent of procedures.  This included time spent obtaining a history and physical, reviewing the patient's chart, interpreting lab and imaging studies, re-examining the patient, coordinating care with other providers, and documenting ED care provided.  The patient has evidence of severe sepsis, as well as acute endorgan  damage with elevated creatinine as well as infection from ureteral stone and pyelonephritis, he required aggressive resuscitation with empiric broad-spectrum antibiotics, parenteral IV fluids, and arrangement of emergent surgical intervention.  He was transferred directly from the emergency department to the operating room for ongoing care and will be hospitalized thereafter.  His condition carries high morbidity and mortality.      Scribe Disclosure:  I, NETO WAHL, am serving as a scribe at 12:56 PM on 1/20/2023 to document services personally performed by King Burns MD based on my observations and the provider's statements to me.    1/20/2023   King Burns MD Reitsema, Jeffrey Alan, MD  01/20/23 1723       King Burns MD  01/20/23 1736

## 2023-01-20 NOTE — ANESTHESIA PREPROCEDURE EVALUATION
Anesthesia Pre-Procedure Evaluation    Patient: Roberto Albright   MRN: [id number] : 1970        Procedure : Procedure(s):  CYSTOSCOPY, WITH LEFT RETROGRADE PYELOGRAM AND LEFT URETERAL STENT INSERTION          Past Medical History:   Diagnosis Date     CAD (coronary artery disease)      Calculus of kidney      Hyperlipidemia      Hypertension       Past Surgical History:   Procedure Laterality Date     BYPASS GRAFT ARTERY CORONARY       CYSTOURETEROSCOPY, WITH LITHOTRIPSY USING ANIYA 120P LASER AND URETERAL STENT INSERTION Bilateral 1/3/2023    Procedure: CYSTOSCOPY, BILATERAL RETROGRADE PYELOGRAM, RIGHT URETEROSCOPY WITH LASER LITHOTRIPSY, BILATERAL URETERAL STENT PLACEMENT;  Surgeon: Ishan Serrano MD;  Location: [facility] OR     HERNIA REPAIR Left       No Known Allergies   Social History     Tobacco Use     Smoking status: Every Day     Smokeless tobacco: Not on file   Substance Use Topics     Alcohol use: No      Wt Readings from Last 1 Encounters:   23 107.5 kg (237 lb)        Anesthesia Evaluation            ROS/MED HX  ENT/Pulmonary:     (+) tobacco use,     Neurologic:       Cardiovascular:     (+) hypertension--CAD -CABG-stent-Taking blood thinners     METS/Exercise Tolerance:     Hematologic:       Musculoskeletal:       GI/Hepatic:       Renal/Genitourinary:     (+) Nephrolithiasis ,     Endo:     (+) Obesity (BM I32),     Psychiatric/Substance Use:       Infectious Disease: Comment: Sepsis        Malignancy:       Other:            Physical Exam    Airway        Mallampati: II   TM distance: > 3 FB   Neck ROM: full     Respiratory Devices and Support         Dental           Cardiovascular   cardiovascular exam normal          Pulmonary   pulmonary exam normal                OUTSIDE LABS:  CBC:   Lab Results   Component Value Date    WBC 22.2 (H) 2023    WBC 11.9 (H) 2017    HGB 14.4 2023    HGB 17.2 2017    HCT 43.2 2023    HCT 51.8 2017    PLT  233 01/20/2023     01/27/2017     BMP:   Lab Results   Component Value Date     (L) 01/20/2023     01/27/2017    POTASSIUM 3.4 01/20/2023    POTASSIUM 3.9 12/31/2020    CHLORIDE 96 (L) 01/20/2023    CHLORIDE 109 01/27/2017    CO2 22 01/20/2023    CO2 25 01/27/2017    BUN 39.4 (H) 01/20/2023    BUN 15 01/27/2017    CR 2.53 (H) 01/20/2023    CR 1.00 12/31/2020     (H) 01/20/2023     (A) 12/31/2020     COAGS: No results found for: PTT, INR, FIBR  POC: No results found for: BGM, HCG, HCGS  HEPATIC:   Lab Results   Component Value Date    ALBUMIN 3.5 01/20/2023    PROTTOTAL 6.7 01/20/2023     (H) 01/20/2023    AST 96 (H) 01/20/2023    ALKPHOS 152 (H) 01/20/2023    BILITOTAL 1.5 (H) 01/20/2023     OTHER:   Lab Results   Component Value Date    LACT 1.1 01/20/2023    URIEL 9.0 01/20/2023    LIPASE 78 (H) 01/20/2023       Anesthesia Plan    ASA Status:  3, emergent       Anesthesia Type: General.      Maintenance: Balanced.        Consents            Postoperative Care    Pain management: IV analgesics, Oral pain medications, Multi-modal analgesia.   PONV prophylaxis: Ondansetron (or other 5HT-3), Dexamethasone or Solumedrol     Comments:                Evelin Collier MD

## 2023-01-21 LAB
ALBUMIN SERPL BCG-MCNC: 2.9 G/DL (ref 3.5–5.2)
ALP SERPL-CCNC: 147 U/L (ref 40–129)
ALT SERPL W P-5'-P-CCNC: 117 U/L (ref 10–50)
ANION GAP SERPL CALCULATED.3IONS-SCNC: 14 MMOL/L (ref 7–15)
AST SERPL W P-5'-P-CCNC: 91 U/L (ref 10–50)
BILIRUB SERPL-MCNC: 0.8 MG/DL
BUN SERPL-MCNC: 36.1 MG/DL (ref 6–20)
CALCIUM SERPL-MCNC: 8.4 MG/DL (ref 8.6–10)
CHLORIDE SERPL-SCNC: 106 MMOL/L (ref 98–107)
CREAT SERPL-MCNC: 1.82 MG/DL (ref 0.67–1.17)
DEPRECATED HCO3 PLAS-SCNC: 20 MMOL/L (ref 22–29)
ENTEROCOCCUS FAECALIS: NOT DETECTED
ENTEROCOCCUS FAECIUM: NOT DETECTED
ERYTHROCYTE [DISTWIDTH] IN BLOOD BY AUTOMATED COUNT: 14.4 % (ref 10–15)
GFR SERPL CREATININE-BSD FRML MDRD: 44 ML/MIN/1.73M2
GLUCOSE BLDC GLUCOMTR-MCNC: 109 MG/DL (ref 70–99)
GLUCOSE BLDC GLUCOMTR-MCNC: 140 MG/DL (ref 70–99)
GLUCOSE BLDC GLUCOMTR-MCNC: 142 MG/DL (ref 70–99)
GLUCOSE SERPL-MCNC: 121 MG/DL (ref 70–99)
HCT VFR BLD AUTO: 40.8 % (ref 40–53)
HGB BLD-MCNC: 13.4 G/DL (ref 13.3–17.7)
LISTERIA SPECIES (DETECTED/NOT DETECTED): NOT DETECTED
MCH RBC QN AUTO: 27.1 PG (ref 26.5–33)
MCHC RBC AUTO-ENTMCNC: 32.8 G/DL (ref 31.5–36.5)
MCV RBC AUTO: 82 FL (ref 78–100)
PLATELET # BLD AUTO: 258 10E3/UL (ref 150–450)
POTASSIUM SERPL-SCNC: 4.1 MMOL/L (ref 3.4–5.3)
PROT SERPL-MCNC: 6 G/DL (ref 6.4–8.3)
RBC # BLD AUTO: 4.95 10E6/UL (ref 4.4–5.9)
SODIUM SERPL-SCNC: 140 MMOL/L (ref 136–145)
STAPHYLOCOCCUS AUREUS: NOT DETECTED
STAPHYLOCOCCUS EPIDERMIDIS: DETECTED
STAPHYLOCOCCUS LUGDUNENSIS: NOT DETECTED
STREPTOCOCCUS AGALACTIAE: NOT DETECTED
STREPTOCOCCUS ANGINOSUS GROUP: NOT DETECTED
STREPTOCOCCUS PNEUMONIAE: NOT DETECTED
STREPTOCOCCUS PYOGENES: NOT DETECTED
STREPTOCOCCUS SPECIES: NOT DETECTED
WBC # BLD AUTO: 16.6 10E3/UL (ref 4–11)

## 2023-01-21 PROCEDURE — 250N000011 HC RX IP 250 OP 636: Performed by: INTERNAL MEDICINE

## 2023-01-21 PROCEDURE — 85027 COMPLETE CBC AUTOMATED: CPT | Performed by: INTERNAL MEDICINE

## 2023-01-21 PROCEDURE — 99233 SBSQ HOSP IP/OBS HIGH 50: CPT | Performed by: INTERNAL MEDICINE

## 2023-01-21 PROCEDURE — 250N000013 HC RX MED GY IP 250 OP 250 PS 637: Performed by: INTERNAL MEDICINE

## 2023-01-21 PROCEDURE — 120N000001 HC R&B MED SURG/OB

## 2023-01-21 PROCEDURE — 99231 SBSQ HOSP IP/OBS SF/LOW 25: CPT | Performed by: UROLOGY

## 2023-01-21 PROCEDURE — 80053 COMPREHEN METABOLIC PANEL: CPT | Performed by: INTERNAL MEDICINE

## 2023-01-21 PROCEDURE — 258N000003 HC RX IP 258 OP 636: Performed by: INTERNAL MEDICINE

## 2023-01-21 PROCEDURE — 36415 COLL VENOUS BLD VENIPUNCTURE: CPT | Performed by: INTERNAL MEDICINE

## 2023-01-21 RX ADMIN — ASPIRIN 81 MG: 81 TABLET, COATED ORAL at 09:05

## 2023-01-21 RX ADMIN — TAZOBACTAM SODIUM AND PIPERACILLIN SODIUM 3.38 G: 375; 3 INJECTION, SOLUTION INTRAVENOUS at 02:05

## 2023-01-21 RX ADMIN — TAZOBACTAM SODIUM AND PIPERACILLIN SODIUM 3.38 G: 375; 3 INJECTION, SOLUTION INTRAVENOUS at 22:01

## 2023-01-21 RX ADMIN — TAZOBACTAM SODIUM AND PIPERACILLIN SODIUM 3.38 G: 375; 3 INJECTION, SOLUTION INTRAVENOUS at 14:42

## 2023-01-21 RX ADMIN — METOPROLOL TARTRATE 75 MG: 50 TABLET, FILM COATED ORAL at 22:08

## 2023-01-21 RX ADMIN — METOPROLOL TARTRATE 75 MG: 50 TABLET, FILM COATED ORAL at 09:05

## 2023-01-21 RX ADMIN — SODIUM CHLORIDE: 9 INJECTION, SOLUTION INTRAVENOUS at 18:11

## 2023-01-21 RX ADMIN — CLOPIDOGREL BISULFATE 75 MG: 75 TABLET ORAL at 09:05

## 2023-01-21 RX ADMIN — SODIUM CHLORIDE: 9 INJECTION, SOLUTION INTRAVENOUS at 06:40

## 2023-01-21 RX ADMIN — ATORVASTATIN CALCIUM 80 MG: 40 TABLET, FILM COATED ORAL at 09:05

## 2023-01-21 RX ADMIN — TAZOBACTAM SODIUM AND PIPERACILLIN SODIUM 3.38 G: 375; 3 INJECTION, SOLUTION INTRAVENOUS at 09:07

## 2023-01-21 ASSESSMENT — ACTIVITIES OF DAILY LIVING (ADL)
ADLS_ACUITY_SCORE: 18

## 2023-01-21 NOTE — PROGRESS NOTES
Patient Transfer Information  Patient connected to monitoring equipment on arrival: yes Capnography     Patient connected to wall oxygen on arrival: N/A    Belongings: Transferred with patient    Safety check completed: Yes

## 2023-01-21 NOTE — PLAN OF CARE
Goal Outcome Evaluation:  Vitals:/62 (BP Location: Right arm)   Pulse 70   Temp 98  F (36.7  C) (Oral)   Resp 20   Ht 1.829 m (6')   Wt 107.5 kg (237 lb)   SpO2 97%   BMI 32.14 kg/m      Pain: denies  Neuro: A&O  Respiratory: WDL  GI/: Herrera pulled this am. Pt voiding spontaneously.   LDAs: PIV infusing NS at 100ml/hour   Labs: WBC 16.6  Diet: 2 g Na low fat  Activity: ind in room  Plan: continue to monitor infection. Zosyn q 6.

## 2023-01-21 NOTE — PROGRESS NOTES
[facility]  Hospitalist Progress Note  Olivia Munoz MD 01/21/2023    Reason for Stay (Diagnosis): sepsis 2/2 bilateral ureterolithiasis with obstruction         Assessment and Plan:      Summary of Stay: Roberto Albright is a 52 year old male with a history of CAD s/p CABG, htn/hlp, with hx of kidney stones admitted on 1/20/2023 with n/v and fever and found to have e/o UTI with bilateral ureterolithiasis with obstruction     Back on 12/1 he had some LLQ abdominal pain prompting UC visit and CT revealing bilateral ureteral stones causing moderate right and mild left hydronephrosis.  He was referred to urology and seen on 12/30 with plans for elective PCNL (percutaneous nephrostolithotomy) 2/20/2023 for definitive stone treatment.  On 1/3/2023 he had bilateral ureteral stent placement with laser lithotripsy, and on 1/11 had left ureteral stent removed.    He presented here with sense of feeling feverish/warm alternating with cold and shivering from UC when initial vitals there revealed a BP 80/40.     ER BPs on the low side in the 90/70 range, other VSS, and he was afebrile.   CMP notable for bun/creat 40/2.5 consistent with ERIC, LFTs with mild diffuse elevation.    CBC with leukocytosis 22 with neutrophilia, procal 2.5, lactate 2.5    UA grossly inflammatory    CT CAP   1.  Obstructing 18 mm calculus in the LEFT renal pelvis with moderate  left hydronephrosis and perinephric stranding. Additional cluster of  nonobstructing calculi at the lower pole of the left kidney.  2.  Few tiny residual RIGHT renal calculi with a right ureteral stent   in good position. Mild right hydroureteronephrosis.      And so admitted with sepsis 2/2 UTI in setting of recurrent bilateral ureterolithiasis with mild right hydro and mod left hydro and left peripnephric stranding consistent with pyelonephritis, and e/o ERIC.  He was urgently taken for cysto for left ureteral stent placement.      Problem List:    Sepsis  Hypotension/leukocytosis/ERIC 2/2 UTI  -sepsis is overall resolved    Complicated UTI with nephro/ureterolithiasis with ongoing bilateral hydro L>R  Right stent in place  Appreciate urology input, s/p urgent cysto with left ureteral stent placement 1/20/2023  Cont with broad spectrum abx, on day 2 pip/tazo  UCx and BCx NGTD  -chavarria pulled this am     ERIC  At baseline has normal renal function  On presentation bun/creat 40/2.5 suggesting combination of prerenal azotemia and ATN  Improving with IVF  -cont IVF overnight and recheck bmp in am     Elevated LFTs   looks like due to sepsis  -recheck in am     CAD with hx of CABG and stents  htn/hlp  EF low normal 50-55% in 1/2021  pta on asa/clopidogrel, metop, and atorva  -all resumed on admission and so far no troubles with hematuria    Incidental pulmonary nodules  Very small but multiple  MULTIPLE NODULES  Nodule size <6 mm  Low-risk patients: No follow-up needed.  High-risk patients: Optional follow-up at 12 months      Covid negative  No documentation of vaccination         DVT Prophylaxis: Pneumatic Compression Devices and Ambulate every shift  Code Status: Full Code  Functional Status: independent  Diet: regular  Chavarria: pulled this am  Access PIV    Disposition Plan   Expected discharge in 1-2 days to prior living arrangement once renal function close to baseline and hope to have organism identified or cultures remain negative.     Entered: Olivia Munoz MD 01/21/2023, 12:34 PM               Interval History (Subjective):      Feeling a lot better.  No cp or sob. No n/v, no fevers.                  Physical Exam:      Last Vital Signs:  /56 (BP Location: Right arm)   Pulse 66   Temp 97.8  F (36.6  C) (Oral)   Resp 20   Ht 1.829 m (6')   Wt 107.5 kg (237 lb)   SpO2 98%   BMI 32.14 kg/m      I/O:    Pleasant nad looks stated age head nc/at sclera clear lungs ctab nl effort rrr ? Fixed split 2nd heart sound no mrg no le edema skin warm and dry  no cyanosis or clubbing belly s/nt/nd alert and oriented affect appropriate emerson            Medications:      All current medications were reviewed with changes reflected in problem list.         Data:      All new lab and imaging data was reviewed.   Labs:  Recent Labs   Lab 01/21/23  1145 01/21/23  0850 01/21/23  0801   NA  --   --  140   POTASSIUM  --   --  4.1   CHLORIDE  --   --  106   CO2  --   --  20*   ANIONGAP  --   --  14   *   < > 121*   BUN  --   --  36.1*   CR  --   --  1.82*   GFRESTIMATED  --   --  44*   URIEL  --   --  8.4*    < > = values in this interval not displayed.     Recent Labs   Lab 01/21/23  0801   WBC 16.6*   HGB 13.4   HCT 40.8   MCV 82        Recent Labs   Lab 01/21/23  1145 01/21/23  0850 01/21/23  0801 01/21/23  0154 01/20/23  2216   * 109* 121* 142* 172*     Recent Labs   Lab 01/21/23  0801 01/20/23  1259   AST 91* 96*   * 127*   ALKPHOS 147* 152*   BILITOTAL 0.8 1.5*      Imaging:   Results for orders placed or performed during the hospital encounter of 01/20/23   CT Chest Abdomen Pelvis w/o Contrast    Narrative    CT CHEST ABDOMEN PELVIS W/O CONTRAST 1/20/2023 2:44 PM    CLINICAL HISTORY: chills, vomiting, WBC 22, recent bilateral stents  and R lithotripsy, should have residual L stent; Cr too high for  contrast    TECHNIQUE: CT scan of the chest, abdomen, and pelvis was performed  without IV contrast. Multiplanar reformats were obtained. Dose  reduction techniques were used.   CONTRAST: None.    COMPARISON: 1/3/2023    FINDINGS:   LUNGS AND PLEURA: The lungs are clear. No pleural effusion. 3 mm left  lower lobe nodule (series 2, image 188) and 3 mm right upper lobe  nodule (series 3, image 86).    MEDIASTINUM/AXILLAE: 1.7 cm nodule in the thyroid isthmus. No  lymphadenopathy. No thoracic aortic aneurysm. CABG.    HEPATOBILIARY: Hepatic steatosis. Cholelithiasis.    PANCREAS: Normal.    SPLEEN: Normal.    ADRENAL GLANDS: Normal.    KIDNEYS/BLADDER:    Right: A few small residual right renal calculi measuring up to 5 mm.  Right ureteral stent in good position. Mild right  hydroureteronephrosis and perinephric stranding. No mass evident on  noncontrast CT    Left: An 18 mm staghorn calculus in the left renal pelvis and cluster  of nonobstructing calculi at the lower pole of the left kidney  measuring up to 11 mm. Moderate left hydronephrosis with perinephric  stranding. No masses evident on noncontrast CT. No dilatation of the  left ureter.    Urinary bladder: Nearly decompressed. No calculi or surrounding  information.    BOWEL: Diverticulosis in the colon. No acute inflammatory change. No  obstruction. Normal appendix.    LYMPH NODES: Small retroperitoneal lymph nodes, the largest being a  1.3 cm left para-aortic lymph node, nonspecific and likely reactive.    VASCULATURE: No abdominal aortic aneurysm.    PELVIC ORGANS: No pelvic masses.    OTHER: No free fluid or fluid collections. Small fat-containing  umbilical hernia. No free air.    MUSCULOSKELETAL: Degenerative changes in the spine. No suspicious  lesions in the bones.      Impression    IMPRESSION:  1.  Obstructing 18 mm calculus in the LEFT renal pelvis with moderate  left hydronephrosis and perinephric stranding. Additional cluster of  nonobstructing calculi at the lower pole of the left kidney.  2.  Few tiny residual RIGHT renal calculi with a right ureteral stent  in good position. Mild right hydroureteronephrosis.  3.  No other acute findings in the chest, abdomen and pelvis.  4.  Tiny pulmonary nodules measuring up to 4 mm. See follow quadrants.  5.  Hepatic steatosis.  6.  Cholelithiasis.    REFERENCE:  Guidelines for Management of Incidental Pulmonary Nodules Detected on  CT Images: From the Fleischner Society 2017.   Guidelines apply to incidental nodules in patients who are 35 years or  older.  Guidelines do not apply to lung cancer screening, patients with  immunosuppression, or patients with  known primary cancer.    MULTIPLE NODULES  Nodule size <6 mm  Low-risk patients: No follow-up needed.  High-risk patients: Optional follow-up at 12 months.    JUDD SNOWDEN MD         SYSTEM ID:  [id number]   XR Surgery MARIE L/T 5 Min Fluoro w Stills    Narrative    This exam was marked as non-reportable because it will not be read by a   radiologist or a [facility] non-radiologist provider.

## 2023-01-21 NOTE — PROGRESS NOTES
Urology    Patient needed to have left stent replaced yesterday due to UTI and obstructing stone.  He is feeling better today, has appetite  Afebrile    Abd S/NT/ND  Herrera with clear urine  WBC 16 today    A/P: Improving  Herrera out this AM  When patient is stable, OK to discharge on 1 more week PO antibiotics. If cultures not back before discharge, these need to be followed post discharge    He is already scheduled for stone surgery in February    Urology will sign off, please call if further questions

## 2023-01-21 NOTE — ANESTHESIA POSTPROCEDURE EVALUATION
Patient: Roberto Albright    Procedure: Procedure(s):  CYSTOSCOPY, WITH LEFT RETROGRADE PYELOGRAM AND LEFT URETERAL STENT INSERTION       Anesthesia Type:  General    Note:     Postop Pain Control: Uneventful            Sign Out: Well controlled pain   PONV: No   Neuro/Psych: Uneventful            Sign Out: Acceptable/Baseline neuro status   Airway/Respiratory: Uneventful            Sign Out: Acceptable/Baseline resp. status   CV/Hemodynamics: Uneventful            Sign Out: Acceptable CV status   Other NRE: NONE   DID A NON-ROUTINE EVENT OCCUR? No           Last vitals:  Vitals Value Taken Time   /72 01/20/23 1815   Temp 97  F (36.1  C) 01/20/23 1814   Pulse 80 01/20/23 1815   Resp 20 01/20/23 1815   SpO2 97 % 01/20/23 1815   Vitals shown include unvalidated device data.    Electronically Signed By: Buzz Clemens MD  January 20, 2023  6:33 PM

## 2023-01-21 NOTE — PLAN OF CARE
/62   Pulse 68   Temp 98.2  F (36.8  C) (Oral)   Resp 20   Ht 1.829 m (6')   Wt 107.5 kg (237 lb)   SpO2 93%   BMI 32.14 kg/m      VSS stable. LS clear bilateral. Cardiac diet. IV Zosyn. Skin clean dry and intact. Cath placed, patent. No pain at cath site. Oxy given for flank pain. During NOC shift , 900. Pt Denies pain. Will continue POC. Discharge TBD.

## 2023-01-22 LAB
ALBUMIN SERPL BCG-MCNC: 2.9 G/DL (ref 3.5–5.2)
ALP SERPL-CCNC: 142 U/L (ref 40–129)
ALT SERPL W P-5'-P-CCNC: 155 U/L (ref 10–50)
ANION GAP SERPL CALCULATED.3IONS-SCNC: 13 MMOL/L (ref 7–15)
AST SERPL W P-5'-P-CCNC: 133 U/L (ref 10–50)
BILIRUB SERPL-MCNC: 1.2 MG/DL
BUN SERPL-MCNC: 28.8 MG/DL (ref 6–20)
CALCIUM SERPL-MCNC: 8.3 MG/DL (ref 8.6–10)
CHLORIDE SERPL-SCNC: 106 MMOL/L (ref 98–107)
CREAT SERPL-MCNC: 1.8 MG/DL (ref 0.67–1.17)
CREAT UR-MCNC: 47 MG/DL
DEPRECATED HCO3 PLAS-SCNC: 21 MMOL/L (ref 22–29)
ERYTHROCYTE [DISTWIDTH] IN BLOOD BY AUTOMATED COUNT: 14.6 % (ref 10–15)
FRACT EXCRET NA UR+SERPL-RTO: 1.6 %
GFR SERPL CREATININE-BSD FRML MDRD: 44 ML/MIN/1.73M2
GLUCOSE SERPL-MCNC: 93 MG/DL (ref 70–99)
HCT VFR BLD AUTO: 41.1 % (ref 40–53)
HGB BLD-MCNC: 13.8 G/DL (ref 13.3–17.7)
MCH RBC QN AUTO: 27.4 PG (ref 26.5–33)
MCHC RBC AUTO-ENTMCNC: 33.6 G/DL (ref 31.5–36.5)
MCV RBC AUTO: 82 FL (ref 78–100)
OSMOLALITY UR: 303 MMOL/KG (ref 100–1200)
PLATELET # BLD AUTO: 301 10E3/UL (ref 150–450)
POTASSIUM SERPL-SCNC: 3.7 MMOL/L (ref 3.4–5.3)
PROT SERPL-MCNC: 5.9 G/DL (ref 6.4–8.3)
RBC # BLD AUTO: 5.04 10E6/UL (ref 4.4–5.9)
SODIUM SERPL-SCNC: 140 MMOL/L (ref 136–145)
SODIUM UR-SCNC: 58 MMOL/L
WBC # BLD AUTO: 11.5 10E3/UL (ref 4–11)

## 2023-01-22 PROCEDURE — 84300 ASSAY OF URINE SODIUM: CPT | Performed by: INTERNAL MEDICINE

## 2023-01-22 PROCEDURE — 36415 COLL VENOUS BLD VENIPUNCTURE: CPT | Performed by: INTERNAL MEDICINE

## 2023-01-22 PROCEDURE — 87040 BLOOD CULTURE FOR BACTERIA: CPT | Performed by: STUDENT IN AN ORGANIZED HEALTH CARE EDUCATION/TRAINING PROGRAM

## 2023-01-22 PROCEDURE — 258N000003 HC RX IP 258 OP 636: Performed by: INTERNAL MEDICINE

## 2023-01-22 PROCEDURE — 250N000011 HC RX IP 250 OP 636: Performed by: INTERNAL MEDICINE

## 2023-01-22 PROCEDURE — 36415 COLL VENOUS BLD VENIPUNCTURE: CPT | Performed by: STUDENT IN AN ORGANIZED HEALTH CARE EDUCATION/TRAINING PROGRAM

## 2023-01-22 PROCEDURE — 83935 ASSAY OF URINE OSMOLALITY: CPT | Performed by: INTERNAL MEDICINE

## 2023-01-22 PROCEDURE — 250N000013 HC RX MED GY IP 250 OP 250 PS 637: Performed by: INTERNAL MEDICINE

## 2023-01-22 PROCEDURE — 120N000001 HC R&B MED SURG/OB

## 2023-01-22 PROCEDURE — 80053 COMPREHEN METABOLIC PANEL: CPT | Performed by: INTERNAL MEDICINE

## 2023-01-22 PROCEDURE — 99207 PR NO BILLABLE SERVICE THIS VISIT: CPT | Performed by: INTERNAL MEDICINE

## 2023-01-22 PROCEDURE — 99232 SBSQ HOSP IP/OBS MODERATE 35: CPT | Performed by: INTERNAL MEDICINE

## 2023-01-22 PROCEDURE — 85027 COMPLETE CBC AUTOMATED: CPT | Performed by: INTERNAL MEDICINE

## 2023-01-22 RX ADMIN — ATORVASTATIN CALCIUM 80 MG: 40 TABLET, FILM COATED ORAL at 09:16

## 2023-01-22 RX ADMIN — METOPROLOL TARTRATE 75 MG: 50 TABLET, FILM COATED ORAL at 21:31

## 2023-01-22 RX ADMIN — TAZOBACTAM SODIUM AND PIPERACILLIN SODIUM 3.38 G: 375; 3 INJECTION, SOLUTION INTRAVENOUS at 09:16

## 2023-01-22 RX ADMIN — HYDROCODONE BITARTRATE AND ACETAMINOPHEN 2 TABLET: 5; 325 TABLET ORAL at 21:31

## 2023-01-22 RX ADMIN — SODIUM CHLORIDE 1000 ML: 9 INJECTION, SOLUTION INTRAVENOUS at 16:30

## 2023-01-22 RX ADMIN — TAZOBACTAM SODIUM AND PIPERACILLIN SODIUM 3.38 G: 375; 3 INJECTION, SOLUTION INTRAVENOUS at 21:31

## 2023-01-22 RX ADMIN — ASPIRIN 81 MG: 81 TABLET, COATED ORAL at 09:16

## 2023-01-22 RX ADMIN — CLOPIDOGREL BISULFATE 75 MG: 75 TABLET ORAL at 09:16

## 2023-01-22 RX ADMIN — TAZOBACTAM SODIUM AND PIPERACILLIN SODIUM 3.38 G: 375; 3 INJECTION, SOLUTION INTRAVENOUS at 14:28

## 2023-01-22 RX ADMIN — SODIUM CHLORIDE: 9 INJECTION, SOLUTION INTRAVENOUS at 04:56

## 2023-01-22 RX ADMIN — METOPROLOL TARTRATE 75 MG: 50 TABLET, FILM COATED ORAL at 09:16

## 2023-01-22 RX ADMIN — TAZOBACTAM SODIUM AND PIPERACILLIN SODIUM 3.38 G: 375; 3 INJECTION, SOLUTION INTRAVENOUS at 02:47

## 2023-01-22 ASSESSMENT — ACTIVITIES OF DAILY LIVING (ADL)
ADLS_ACUITY_SCORE: 18

## 2023-01-22 NOTE — PROGRESS NOTES
[facility]  Hospitalist Progress Note  Olivia Munoz MD 01/22/2023    Reason for Stay (Diagnosis): sepsis 2/2 bilateral ureterolithiasis with obstruction         Assessment and Plan:      Summary of Stay: Roberto Albright is a 52 year old male with a history of CAD s/p CABG, htn/hlp, with hx of kidney stones admitted on 1/20/2023 with n/v and fever and found to have e/o UTI with bilateral ureterolithiasis with obstruction     Back on 12/1 he had some LLQ abdominal pain prompting UC visit and CT revealing bilateral ureteral stones causing moderate right and mild left hydronephrosis.  He was referred to urology and seen on 12/30 with plans for elective PCNL (percutaneous nephrostolithotomy) 2/20/2023 for definitive stone treatment.  On 1/3/2023 he had bilateral ureteral stent placement with laser lithotripsy, and on 1/11 had left ureteral stent removed.    He presented here with sense of feeling feverish/warm alternating with cold and shivering from UC when initial vitals there revealed a BP 80/40.     ER BPs on the low side in the 90/70 range, other VSS, and he was afebrile.   CMP notable for bun/creat 40/2.5 consistent with ERIC, LFTs with mild diffuse elevation.    CBC with leukocytosis 22 with neutrophilia, procal 2.5, lactate 2.5    UA grossly inflammatory and UCx with 10-50 K staph epi  AND 1/2 BCx + for MRSE    CT CAP   1.  Obstructing 18 mm calculus in the LEFT renal pelvis with moderate  left hydronephrosis and perinephric stranding. Additional cluster of  nonobstructing calculi at the lower pole of the left kidney.  2.  Few tiny residual RIGHT renal calculi with a right ureteral stent   in good position. Mild right hydroureteronephrosis.      And so admitted with sepsis 2/2 UTI in setting of recurrent bilateral ureterolithiasis with mild right hydro and mod left hydro and left peripnephric stranding consistent with pyelonephritis, and e/o ERIC.  He was urgently taken for cysto for left ureteral stent  "placement.      Problem List:   Sepsis  Hypotension/leukocytosis/ERIC 2/2 UTI  -sepsis is overall resolved    Complicated UTI with nephro/ureterolithiasis with ongoing bilateral hydro L>R  Right stent in place  Appreciate urology input, s/p urgent cysto with left ureteral stent placement 1/20/2023  Cont with broad spectrum abx, on day 3 pip/tazo  UCx + for 10-50 K staph epi, and now 1/2 BCx + for MRSE.  Difficult to give vanco in light of ongoing ERIC.  Discussed with ID who think given ongoing clinical improvement that pip-tazo likely adequate, hold off on vanco and await formal S  -requested Sensitivities to be run on both blood and urine   -chavarria pulled am 1/21    ERIC  At baseline has normal renal function  On presentation bun/creat 40/2.5 suggesting combination of prerenal azotemia and ATN  Was improving with IVF but now seems to have stagnated  -FeNa 1.6, Porfirio 58 so not sodium avid, Uosm 300-so I think the prerenal component has been treated and now just the ATN from sepsis remains.  Will give additional IVF to \"flush\" the kidneys     Elevated LFTs   looks like due to sepsis  -recheck in am     CAD with hx of CABG and stents  htn/hlp  EF low normal 50-55% in 1/2021  pta on asa/clopidogrel, metop, and atorva  -all resumed on admission and so far no troubles with hematuria    Incidental pulmonary nodules  Very small but multiple  MULTIPLE NODULES  Nodule size <6 mm  Low-risk patients: No follow-up needed.  High-risk patients: Optional follow-up at 12 months  Incidental thyroid nodule 1.7 cm in the thyroid isthmus  -follow-up with PCP for further evaluation     Covid negative  No documentation of vaccination     DVT Prophylaxis: Pneumatic Compression Devices and Ambulate every shift  Code Status: Full Code  Functional Status: independent  Diet: regular  Chavarria: pulled this am  Access PIV    Disposition Plan   Expected discharge in 1-2 days to prior living arrangement once renal function close to baseline and hope to " have organism identified or cultures remain negative.     Entered: Olivia Munoz MD 01/22/2023, 3:57 PM       Time spent 40 minutes reviewing epic including notes/labs/prior hx, current medications.  In addition to interviewing and examining the patient, updated patient and family regarding plan of care           Interval History (Subjective):      Continues to feel well  No cp or sob. No n/v, no fevers.                  Physical Exam:      Last Vital Signs:  /69 (BP Location: Right arm)   Pulse 65   Temp 98.6  F (37  C) (Oral)   Resp 18   Ht 1.829 m (6')   Wt 107.5 kg (237 lb)   SpO2 98%   BMI 32.14 kg/m      I/O:    Pleasant nad looks stated age head nc/at sclera clear lungs ctab nl effort rrr ? Fixed split 2nd heart sound no mrg no le edema skin warm and dry no cyanosis or clubbing belly s/nt/nd alert and oriented affect appropriate emerson            Medications:      All current medications were reviewed with changes reflected in problem list.         Data:      All new lab and imaging data was reviewed.   Labs:  Recent Labs   Lab 01/22/23  0652      POTASSIUM 3.7   CHLORIDE 106   CO2 21*   ANIONGAP 13   GLC 93   BUN 28.8*   CR 1.80*   GFRESTIMATED 44*   URIEL 8.3*     Recent Labs   Lab 01/22/23  0652   WBC 11.5*   HGB 13.8   HCT 41.1   MCV 82        Recent Labs   Lab 01/22/23  0652 01/21/23  1145 01/21/23  0850 01/21/23  0801 01/21/23  0154   GLC 93 140* 109* 121* 142*     Recent Labs   Lab 01/22/23  0652 01/21/23  0801 01/20/23  1259   * 91* 96*   * 117* 127*   ALKPHOS 142* 147* 152*   BILITOTAL 1.2 0.8 1.5*      Imaging:   Results for orders placed or performed during the hospital encounter of 01/20/23   CT Chest Abdomen Pelvis w/o Contrast    Narrative    CT CHEST ABDOMEN PELVIS W/O CONTRAST 1/20/2023 2:44 PM    CLINICAL HISTORY: chills, vomiting, WBC 22, recent bilateral stents  and R lithotripsy, should have residual L stent; Cr too high for  contrast    TECHNIQUE: CT  scan of the chest, abdomen, and pelvis was performed  without IV contrast. Multiplanar reformats were obtained. Dose  reduction techniques were used.   CONTRAST: None.    COMPARISON: 1/3/2023    FINDINGS:   LUNGS AND PLEURA: The lungs are clear. No pleural effusion. 3 mm left  lower lobe nodule (series 2, image 188) and 3 mm right upper lobe  nodule (series 3, image 86).    MEDIASTINUM/AXILLAE: 1.7 cm nodule in the thyroid isthmus. No  lymphadenopathy. No thoracic aortic aneurysm. CABG.    HEPATOBILIARY: Hepatic steatosis. Cholelithiasis.    PANCREAS: Normal.    SPLEEN: Normal.    ADRENAL GLANDS: Normal.    KIDNEYS/BLADDER:   Right: A few small residual right renal calculi measuring up to 5 mm.  Right ureteral stent in good position. Mild right  hydroureteronephrosis and perinephric stranding. No mass evident on  noncontrast CT    Left: An 18 mm staghorn calculus in the left renal pelvis and cluster  of nonobstructing calculi at the lower pole of the left kidney  measuring up to 11 mm. Moderate left hydronephrosis with perinephric  stranding. No masses evident on noncontrast CT. No dilatation of the  left ureter.    Urinary bladder: Nearly decompressed. No calculi or surrounding  information.    BOWEL: Diverticulosis in the colon. No acute inflammatory change. No  obstruction. Normal appendix.    LYMPH NODES: Small retroperitoneal lymph nodes, the largest being a  1.3 cm left para-aortic lymph node, nonspecific and likely reactive.    VASCULATURE: No abdominal aortic aneurysm.    PELVIC ORGANS: No pelvic masses.    OTHER: No free fluid or fluid collections. Small fat-containing  umbilical hernia. No free air.    MUSCULOSKELETAL: Degenerative changes in the spine. No suspicious  lesions in the bones.      Impression    IMPRESSION:  1.  Obstructing 18 mm calculus in the LEFT renal pelvis with moderate  left hydronephrosis and perinephric stranding. Additional cluster of  nonobstructing calculi at the lower pole of the  left kidney.  2.  Few tiny residual RIGHT renal calculi with a right ureteral stent  in good position. Mild right hydroureteronephrosis.  3.  No other acute findings in the chest, abdomen and pelvis.  4.  Tiny pulmonary nodules measuring up to 4 mm. See follow quadrants.  5.  Hepatic steatosis.  6.  Cholelithiasis.    REFERENCE:  Guidelines for Management of Incidental Pulmonary Nodules Detected on  CT Images: From the Fleischner Society 2017.   Guidelines apply to incidental nodules in patients who are 35 years or  older.  Guidelines do not apply to lung cancer screening, patients with  immunosuppression, or patients with known primary cancer.    MULTIPLE NODULES  Nodule size <6 mm  Low-risk patients: No follow-up needed.  High-risk patients: Optional follow-up at 12 months.    JUDD SNOWDEN MD         SYSTEM ID:  [id number]   XR Surgery MARIE L/T 5 Min Fluoro w Stills    Narrative    This exam was marked as non-reportable because it will not be read by a   radiologist or a [facility] non-radiologist provider.

## 2023-01-22 NOTE — PROGRESS NOTES
Cross cover note    Blood cultures growing staph epidermidis (methicillin-resistant).  This is likely a contaminant but as the patient had sepsis due to obstructive pyelonephritis and has right stent, will redo cultures but does not need vancomycin unless sepsis starts worsening.  Currently sepsis appears to be improving with Zosyn.    Jacques Sanchez MD  Internal Medicine Hospitalist  Pager: [phone]

## 2023-01-22 NOTE — PROVIDER NOTIFICATION
@0518 received call from Lab Critical value from Blood culture , positive growth  of Meth resistance  staph epidermis.   Please advise if any change on POC.  Thank you.

## 2023-01-22 NOTE — PROGRESS NOTES
Care from [id number]    Pt is A/O x4, up independent in the room. Pt is voiding spontaneously, denies pain. On Zoysn for UTI. UC and BC +, awaiting sensitivities. Bolus of NS started this evening to run x10 hours. Afebrile, VSS.

## 2023-01-22 NOTE — PLAN OF CARE
/77 (BP Location: Left arm)   Pulse 75   Temp 99.5  F (37.5  C) (Oral)   Resp 18   Ht 1.829 m (6')   Wt 107.5 kg (237 lb)   SpO2 97%   BMI 32.14 kg/m      VSS, Afebrile. Indep. Lungs Sounds clear bilaterally. No swelling noted. Skin is clean and intact. UO yellow clear. Critical abnormal blood cultures growth and + MRSA . MD was noted via pager. Pt is on Zosyn Q6hr. Will continue POC.

## 2023-01-22 NOTE — PROGRESS NOTES
MD Critical Lab Acknowledgement    I was contacted for critical Diagnostic Studies and Labs value:    Positive blood culture report from microbiology labs - gram positive cocci and clusters 1/20/23 1 of 2 bottles   Abnormal result acknowledged.    EMR reviewed , patient already on zosyn , afebrile     Assessment/Problem :    Positive blood culture     Plan/Recommendation:    Continue zosyn , await further testing and sensitivity

## 2023-01-23 VITALS
WEIGHT: 237 LBS | BODY MASS INDEX: 32.1 KG/M2 | SYSTOLIC BLOOD PRESSURE: 131 MMHG | TEMPERATURE: 98.5 F | HEIGHT: 72 IN | HEART RATE: 75 BPM | RESPIRATION RATE: 18 BRPM | OXYGEN SATURATION: 97 % | DIASTOLIC BLOOD PRESSURE: 75 MMHG

## 2023-01-23 LAB
ALBUMIN SERPL BCG-MCNC: 3 G/DL (ref 3.5–5.2)
ALP SERPL-CCNC: 156 U/L (ref 40–129)
ALT SERPL W P-5'-P-CCNC: 200 U/L (ref 10–50)
ANION GAP SERPL CALCULATED.3IONS-SCNC: 14 MMOL/L (ref 7–15)
AST SERPL W P-5'-P-CCNC: 160 U/L (ref 10–50)
ATRIAL RATE - MUSE: 68 BPM
BILIRUB DIRECT SERPL-MCNC: 0.38 MG/DL (ref 0–0.3)
BILIRUB SERPL-MCNC: 1 MG/DL
BUN SERPL-MCNC: 20.7 MG/DL (ref 6–20)
CALCIUM SERPL-MCNC: 8.6 MG/DL (ref 8.6–10)
CHLORIDE SERPL-SCNC: 106 MMOL/L (ref 98–107)
CREAT SERPL-MCNC: 1.56 MG/DL (ref 0.67–1.17)
DEPRECATED HCO3 PLAS-SCNC: 21 MMOL/L (ref 22–29)
DIASTOLIC BLOOD PRESSURE - MUSE: NORMAL MMHG
ERYTHROCYTE [DISTWIDTH] IN BLOOD BY AUTOMATED COUNT: 14.6 % (ref 10–15)
GFR SERPL CREATININE-BSD FRML MDRD: 52 ML/MIN/1.73M2
GLUCOSE SERPL-MCNC: 90 MG/DL (ref 70–99)
HCT VFR BLD AUTO: 41.1 % (ref 40–53)
HGB BLD-MCNC: 13.6 G/DL (ref 13.3–17.7)
INTERPRETATION ECG - MUSE: NORMAL
MCH RBC QN AUTO: 27.2 PG (ref 26.5–33)
MCHC RBC AUTO-ENTMCNC: 33.1 G/DL (ref 31.5–36.5)
MCV RBC AUTO: 82 FL (ref 78–100)
P AXIS - MUSE: 59 DEGREES
PLATELET # BLD AUTO: 370 10E3/UL (ref 150–450)
POTASSIUM SERPL-SCNC: 3.7 MMOL/L (ref 3.4–5.3)
PR INTERVAL - MUSE: 174 MS
PROT SERPL-MCNC: 6.4 G/DL (ref 6.4–8.3)
QRS DURATION - MUSE: 110 MS
QT - MUSE: 416 MS
QTC - MUSE: 442 MS
R AXIS - MUSE: -9 DEGREES
RBC # BLD AUTO: 5 10E6/UL (ref 4.4–5.9)
SODIUM SERPL-SCNC: 141 MMOL/L (ref 136–145)
SYSTOLIC BLOOD PRESSURE - MUSE: NORMAL MMHG
T AXIS - MUSE: 82 DEGREES
VENTRICULAR RATE- MUSE: 68 BPM
WBC # BLD AUTO: 11.5 10E3/UL (ref 4–11)

## 2023-01-23 PROCEDURE — 250N000011 HC RX IP 250 OP 636: Performed by: INTERNAL MEDICINE

## 2023-01-23 PROCEDURE — 99254 IP/OBS CNSLTJ NEW/EST MOD 60: CPT | Performed by: SPECIALIST

## 2023-01-23 PROCEDURE — 250N000013 HC RX MED GY IP 250 OP 250 PS 637: Performed by: INTERNAL MEDICINE

## 2023-01-23 PROCEDURE — 36415 COLL VENOUS BLD VENIPUNCTURE: CPT | Performed by: INTERNAL MEDICINE

## 2023-01-23 PROCEDURE — 85027 COMPLETE CBC AUTOMATED: CPT | Performed by: INTERNAL MEDICINE

## 2023-01-23 PROCEDURE — 80053 COMPREHEN METABOLIC PANEL: CPT | Performed by: INTERNAL MEDICINE

## 2023-01-23 PROCEDURE — 82248 BILIRUBIN DIRECT: CPT | Performed by: INTERNAL MEDICINE

## 2023-01-23 PROCEDURE — 99239 HOSP IP/OBS DSCHRG MGMT >30: CPT | Performed by: INTERNAL MEDICINE

## 2023-01-23 RX ORDER — DOXYCYCLINE 100 MG/1
100 CAPSULE ORAL 2 TIMES DAILY
Qty: 28 CAPSULE | Refills: 0 | Status: ON HOLD | OUTPATIENT
Start: 2023-02-06 | End: 2023-02-20

## 2023-01-23 RX ORDER — LEVOFLOXACIN 500 MG/1
500 TABLET, FILM COATED ORAL DAILY
Qty: 14 TABLET | Refills: 0 | Status: SHIPPED | OUTPATIENT
Start: 2023-01-23 | End: 2023-02-06

## 2023-01-23 RX ADMIN — CLOPIDOGREL BISULFATE 75 MG: 75 TABLET ORAL at 08:23

## 2023-01-23 RX ADMIN — ASPIRIN 81 MG: 81 TABLET, COATED ORAL at 08:23

## 2023-01-23 RX ADMIN — METOPROLOL TARTRATE 75 MG: 50 TABLET, FILM COATED ORAL at 08:23

## 2023-01-23 RX ADMIN — TAZOBACTAM SODIUM AND PIPERACILLIN SODIUM 3.38 G: 375; 3 INJECTION, SOLUTION INTRAVENOUS at 08:23

## 2023-01-23 RX ADMIN — TAZOBACTAM SODIUM AND PIPERACILLIN SODIUM 3.38 G: 375; 3 INJECTION, SOLUTION INTRAVENOUS at 02:42

## 2023-01-23 RX ADMIN — ATORVASTATIN CALCIUM 80 MG: 40 TABLET, FILM COATED ORAL at 08:23

## 2023-01-23 ASSESSMENT — ACTIVITIES OF DAILY LIVING (ADL)
ADLS_ACUITY_SCORE: 18

## 2023-01-23 NOTE — DISCHARGE SUMMARY
Discharge Summary  Hospitalist Service    Roberto Albright MRN# [id number]   YOB: 1970 Age: 52 year old     Date of Admission:  1/20/2023  Date of Discharge:  1/23/2023  Admitting Physician:  Lai Tatum MD  Discharge Physician: Olivia Munoz MD  Discharging Service: Hospitalist Service     Primary Provider: [facility]  Primary Care Physician Phone Number: None         Discharge Diagnoses/Problem Oriented Hospital Course (Providers):      Roberto Albright was admitted on 1/20/2023 by Lai Tatum MD and I would refer you to their history and physical.  The following problems were addressed during his hospitalization:        Summary of Stay: Roberto Albright is a 52 year old male with a history of CAD s/p CABG, htn/hlp, with hx of kidney stones admitted on 1/20/2023 with n/v and fever and found to have e/o UTI with bilateral ureterolithiasis with obstruction      Back on 12/1 he had some LLQ abdominal pain prompting UC visit and CT revealing bilateral ureteral stones causing moderate right and mild left hydronephrosis.  He was referred to urology and seen on 12/30 with plans for elective PCNL (percutaneous nephrostolithotomy) 2/20/2023 for definitive stone treatment.  On 1/3/2023 he had bilateral ureteral stent placement with laser lithotripsy, and on 1/11 had left ureteral stent removed.     He presented here with sense of feeling feverish/warm alternating with cold and shivering from UC when initial vitals there revealed a BP 80/40.      ER BPs on the low side in the 90/70 range, other VSS, and he was afebrile.   CMP notable for bun/creat 40/2.5 consistent with ERIC, LFTs with mild diffuse elevation.    CBC with leukocytosis 22 with neutrophilia, procal 2.5, lactate 2.5     UA grossly inflammatory and UCx with 10-50 K staph epi R to oxacillin   AND 1/2 BCx + for MRSE    Patient treated with pip tazo and responded, discussed and formally consulted ID.  Difficult situation as given  presentation with sepsis, and bcx and ucx matching would assume due to MRSE infection.  Although responded to treatment with pip-tazo.  Further complicated by his ERIC and concern for making it worse with IV vanco    He feels well and is discharging today, period.  He refused to stay another day, which I can understand.     Therefore will discharge with 2 weeks of levofloxacin f/b 2 weeks of doxy to get him through his procedure on the 20th (in case the stone is nidus of infection)      CT CAP   1.  Obstructing 18 mm calculus in the LEFT renal pelvis with moderate  left hydronephrosis and perinephric stranding. Additional cluster of  nonobstructing calculi at the lower pole of the left kidney.  2.  Few tiny residual RIGHT renal calculi with a right ureteral stent   in good position. Mild right hydroureteronephrosis.        And so admitted with sepsis 2/2 UTI in setting of recurrent bilateral ureterolithiasis with mild right hydro and mod left hydro and left peripnephric stranding consistent with pyelonephritis, and e/o ERIC.  He was urgently taken for cysto for left ureteral stent placement.       Problem List:   Sepsis  Hypotension/leukocytosis/ERIC 2/2 UTI and bacteremia  -sepsis is overall resolved     Complicated UTI with nephro/ureterolithiasis with ongoing bilateral hydro L>R  Right stent in place  Bacteremia   Appreciate urology input, s/p urgent cysto with left ureteral stent placement 1/20/2023  Cont with broad spectrum abx, on day 4 pip/tazo  UCx + for 10-50 K staph epi R to oxacillin, and 1/2 BCx + for MRSE.  Difficult to give vanco in light of ongoing ERIC. But responded to pip- tazo.  Very much appreciate ID input, will discharge with 2 weeks of levofloxacin (QTc ok) followed by 2 weeks of doxy to get him through PCNL on 2/20/2023 in case stone is nidus of infection   -chavarria pulled am 1/21     ERIC  At baseline has normal renal function  On presentation bun/creat 40/2.5 suggesting combination of prerenal  azotemia and ATN  -FeNa 1.6, Porfirio 58 so not sodium avid, Uosm 300-so I think the prerenal component has been treated and now just the ATN from sepsis remains.    -creat:  2.5->1.8->1.8->1.56  -ok for discharge avoid nsaids, recheck within the next 3 days with PCP     Elevated LFTs   looks like due to sepsis  -recheck this week with PCP      CAD with hx of CABG and stents  htn/hlp  EF low normal 50-55% in 1/2021  pta on asa/clopidogrel, metop, and atorva  -all resumed on admission and so far no troubles with hematuria     Incidental pulmonary nodules  Very small but multiple  MULTIPLE NODULES  Nodule size <6 mm  Low-risk patients: No follow-up needed.  High-risk patients: Optional follow-up at 12 months  Incidental thyroid nodule 1.7 cm in the thyroid isthmus  -follow-up with PCP for further evaluation      Covid negative  No documentation of vaccination      DVT Prophylaxis: Pneumatic Compression Devices and Ambulate every shift  Code Status: Full Code  Functional Status: independent  Diet: regular  Herrera: pulled am 1/21/2023  Access PIV         Code Status:      Full Code        Brief Hospital Stay Summary Sent Home With Patient in AVS:          Reason for your hospital stay      Obstructed kidney with urinary tract infection and concern for blood   infection                      Important Results:        As noted below          Pending Results:        Unresulted Labs Ordered in the Past 30 Days of this Admission     Date and Time Order Name Status Description    1/22/2023 12:04 AM Blood Culture Hand, Right Preliminary     1/22/2023 12:04 AM Blood Culture Hand, Left Preliminary     1/20/2023  5:41 PM Urine Culture Preliminary     1/20/2023  1:03 PM Blood Culture Arm, Left Preliminary     1/20/2023 12:58 PM Blood Culture Peripheral Blood Preliminary             Discharge Instructions and Follow-Up:      Follow-up Appointments     Follow-up and recommended labs and tests       Follow-up with your PCP this week to get  "your kidney function rechecked      No Ibuprofen or other otc \"NSAIDs\" as they can hurt your kidneys      If you quit peeing you should come to the ED right away as this could   indicate that your kidneys are failing    Your liver tests were also mild to moderately elevated with some   inflammation noted on CT scan.  These should be rechecked at your   follow-up with your PCP as well     Follow-up with your urologist re kidney stone removal and if your date   changes you need to let your PCP know about it    You will be on levofloxacin for 2 weeks starting today and then switch to   doxycycline for 2 weeks after that to cover the period of your   procedure-if your procedure date changes please ensure that your PCP is   aware of the change    You had some incidental findings on your admission CT scan-some time lung   nodules that will need repeat CT scanning in one year to ensure they are   benign.  You were also noted to have a thyroid nodule that will need to be   followed up on as well               Discharge Disposition:        Discharged to home         Discharge Medications:        Current Discharge Medication List      START taking these medications    Details   doxycycline hyclate (VIBRAMYCIN) 100 MG capsule Take 1 capsule (100 mg) by mouth 2 times daily for 14 days  Qty: 28 capsule, Refills: 0    Associated Diagnoses: Severe sepsis (H)      levofloxacin (LEVAQUIN) 500 MG tablet Take 1 tablet (500 mg) by mouth daily for 14 days  Qty: 14 tablet, Refills: 0    Associated Diagnoses: Severe sepsis (H)         CONTINUE these medications which have NOT CHANGED    Details   acetaminophen (TYLENOL) 500 MG tablet Take 500-1,000 mg by mouth every 6 hours as needed for mild pain      aspirin 81 MG EC tablet Take 81 mg by mouth daily      atorvastatin (LIPITOR) 80 MG tablet Take 80 mg by mouth daily      clopidogrel (PLAVIX) 75 MG tablet Take 75 mg by mouth daily      HYDROcodone-acetaminophen (NORCO) 5-325 MG tablet " Take 1-2 tablets by mouth every 4 hours as needed for moderate to severe pain  Qty: 12 tablet, Refills: 0    Associated Diagnoses: Calculus of kidney and ureter      metoprolol tartrate (LOPRESSOR) 50 MG tablet Take 75 mg by mouth 2 times daily      nitroGLYcerin (NITROSTAT) 0.4 MG sublingual tablet Place 0.4 mg under the tongue every 5 minutes as needed for chest pain For chest pain place 1 tablet under the tongue every 5 minutes for 3 doses. If symptoms persist 5 minutes after 1st dose call 911.      sildenafil (VIAGRA) 100 MG tablet Take 100 mg by mouth as needed               Allergies:       No Known Allergies        Consultations This Hospital Stay:        Urology, ID,          Condition and Physical on Discharge:        Discharge condition: Stable   Vitals: Blood pressure 131/75, pulse 75, temperature 98.5  F (36.9  C), temperature source Oral, resp. rate 18, height 1.829 m (6'), weight 107.5 kg (237 lb), SpO2 97 %.     Constitutional: Pleasant nad looks stated age head nc/at sclera clear     Lungs: ctab nl effort   Cardiovascular: rrr no mrg no le edema   Abdomen: S/nt/nd   Skin: Warm and dry no cc   Other: Alert and oriented affect appropriate emerson ambulating independently          Discharge Time:      Greater than 30 minutes.        Image Results From This Hospital Stay (For Non-EPIC Providers):        Results for orders placed or performed during the hospital encounter of 01/20/23   CT Chest Abdomen Pelvis w/o Contrast    Narrative    CT CHEST ABDOMEN PELVIS W/O CONTRAST 1/20/2023 2:44 PM    CLINICAL HISTORY: chills, vomiting, WBC 22, recent bilateral stents  and R lithotripsy, should have residual L stent; Cr too high for  contrast    TECHNIQUE: CT scan of the chest, abdomen, and pelvis was performed  without IV contrast. Multiplanar reformats were obtained. Dose  reduction techniques were used.   CONTRAST: None.    COMPARISON: 1/3/2023    FINDINGS:   LUNGS AND PLEURA: The lungs are clear. No pleural  effusion. 3 mm left  lower lobe nodule (series 2, image 188) and 3 mm right upper lobe  nodule (series 3, image 86).    MEDIASTINUM/AXILLAE: 1.7 cm nodule in the thyroid isthmus. No  lymphadenopathy. No thoracic aortic aneurysm. CABG.    HEPATOBILIARY: Hepatic steatosis. Cholelithiasis.    PANCREAS: Normal.    SPLEEN: Normal.    ADRENAL GLANDS: Normal.    KIDNEYS/BLADDER:   Right: A few small residual right renal calculi measuring up to 5 mm.  Right ureteral stent in good position. Mild right  hydroureteronephrosis and perinephric stranding. No mass evident on  noncontrast CT    Left: An 18 mm staghorn calculus in the left renal pelvis and cluster  of nonobstructing calculi at the lower pole of the left kidney  measuring up to 11 mm. Moderate left hydronephrosis with perinephric  stranding. No masses evident on noncontrast CT. No dilatation of the  left ureter.    Urinary bladder: Nearly decompressed. No calculi or surrounding  information.    BOWEL: Diverticulosis in the colon. No acute inflammatory change. No  obstruction. Normal appendix.    LYMPH NODES: Small retroperitoneal lymph nodes, the largest being a  1.3 cm left para-aortic lymph node, nonspecific and likely reactive.    VASCULATURE: No abdominal aortic aneurysm.    PELVIC ORGANS: No pelvic masses.    OTHER: No free fluid or fluid collections. Small fat-containing  umbilical hernia. No free air.    MUSCULOSKELETAL: Degenerative changes in the spine. No suspicious  lesions in the bones.      Impression    IMPRESSION:  1.  Obstructing 18 mm calculus in the LEFT renal pelvis with moderate  left hydronephrosis and perinephric stranding. Additional cluster of  nonobstructing calculi at the lower pole of the left kidney.  2.  Few tiny residual RIGHT renal calculi with a right ureteral stent  in good position. Mild right hydroureteronephrosis.  3.  No other acute findings in the chest, abdomen and pelvis.  4.  Tiny pulmonary nodules measuring up to 4 mm. See  follow quadrants.  5.  Hepatic steatosis.  6.  Cholelithiasis.    REFERENCE:  Guidelines for Management of Incidental Pulmonary Nodules Detected on  CT Images: From the Fleischner Society 2017.   Guidelines apply to incidental nodules in patients who are 35 years or  older.  Guidelines do not apply to lung cancer screening, patients with  immunosuppression, or patients with known primary cancer.    MULTIPLE NODULES  Nodule size <6 mm  Low-risk patients: No follow-up needed.  High-risk patients: Optional follow-up at 12 months.    JUDD SNOWDEN MD         SYSTEM ID:  [id number]   XR Surgery MARIE L/T 5 Min Fluoro w Stills    Narrative    This exam was marked as non-reportable because it will not be read by a   radiologist or a [place] non-radiologist provider.                 Most Recent Lab Results In EPIC (For Non-EPIC Providers):    Most Recent 3 CBC's:  Recent Labs   Lab Test 01/23/23  0727 01/22/23  0652 01/21/23  0801   WBC 11.5* 11.5* 16.6*   HGB 13.6 13.8 13.4   MCV 82 82 82    301 258      Most Recent 3 BMP's:  Recent Labs   Lab Test 01/23/23  0727 01/22/23  0652 01/21/23  1145 01/21/23  0850 01/21/23  0801    140  --   --  140   POTASSIUM 3.7 3.7  --   --  4.1   CHLORIDE 106 106  --   --  106   CO2 21* 21*  --   --  20*   BUN 20.7* 28.8*  --   --  36.1*   CR 1.56* 1.80*  --   --  1.82*   ANIONGAP 14 13  --   --  14   URIEL 8.6 8.3*  --   --  8.4*   GLC 90 93 140*   < > 121*    < > = values in this interval not displayed.     Most Recent 3 Troponin's:No lab results found.  Most Recent 3 INR's:No lab results found.  Most Recent 2 LFT's:  Recent Labs   Lab Test 01/22/23  0652 01/21/23  0801   * 91*   * 117*   ALKPHOS 142* 147*   BILITOTAL 1.2 0.8

## 2023-01-23 NOTE — PLAN OF CARE
Orientation: A&O X4   VS: WDL  Pain:  Denies  Activity:  Independent  Resp:   WDL  GI:  WDL  : WDL    Lines: PIV SL  Other:  Pt remained complaint free throughout the night.  Plan:  Discharge 1-2 days.

## 2023-01-23 NOTE — CONSULTS
[facility]    Infectious Disease Consultation     Date of Admission:  1/20/2023  Date of Consult : 01/23/23    Assessment:  1.  Obstructing 18 mm calculus in the left renal pelvis with moderate left hydronephrosis and perinephric stranding. S/p cystoscopy with left ureteral stent placement  2. Recent cystoscopy with right ureteroscopy with laser lithotripsy with bilateral ureteral stent placement on 1/3  3. Urine and blood cx positive for Staph epidermidis. Generally staph epidermidis is not a usual urinary pathogen . However, given isolation from urine and blood, should be treated as true infection  4. ERIC related to obstruction, improving      Recommendations:  1. Patient has improved clinically since stent placement though has not been treated for staph epidermidis during hospitalization since he has been on zosyn and staph epi isolate is methicillin resistant  2. 1/4 positive blood cxs may still be due to contamination, however with positive urine cx for s.epi could represent true infection. S.epi generally not a typical urinary pathogen. He has had instrumentation recently however, so could have been introduced  3. Patient declines to stay in hospital further, therefore plan transition to Levaquin 500 mg PO daily at discharge for 2 weeks  4. Following completion of Levaquin , treat with Doxycyline 100 mg PO Bid for another 2 weeks until definitive stone management as planned by Urology  5. Follow repeat blood cxs     Recommendations were discussed with the Hospitalist service    Ramona Rene MD    Reason for Consult    I was asked to evaluate this patient for treatment recommendations for pyelonephritis    Primary Care Physician   [facility]    Chief Complaint   Pyelonephritis / hypotension    History is obtained from the patient and medical records    History of Present Illness   Roberto Albright is a 52 year old male with CAD, HTN, Hypercholesteremia and Kidney stones who  had bilateral ureteral stents placed on 01/3 with ureteroscopy with laser lithotripsy,  and is now admitted with hypotension related to pyelonephritis.    He is admitted with fever , nausea and vomiting with sweats and hypotension and left sided flank pain. Ct showed an obstructing 18 mm calculus in the left renal pelvis with moderate  left hydronephrosis and perinephric stranding, with additional non obstructing calculi at the lower pole of the left kidney. A few tiny residual right renal calculi with a right ureteral stent in good position with mild hydroureteronephrosis was seen    He was in ERIC with creatinine of 2.53 prior normal, with hyponatremia and sepsis syndrome with elevated transaminases- AST 96/, leukocytosis of 22.2K, elevated procalcitonin and hypotension and met sepsis criteria.    Urine cx grew Staph epidermidis and blood cx on admission was also positive for staph epidermidis. He is s/p cystoscopy with left ureteral stent insertion. Fever has resolved and leukocytosis is improving. He has been maintained on zosyn and ID has been asked to assist with antibiotic management. He feels well now and denies flank pain    Past Medical History   I have reviewed this patient's medical history and updated it with pertinent information if needed.   Past Medical History:   Diagnosis Date     CAD (coronary artery disease)      Calculus of kidney      Hyperlipidemia      Hypertension        Past Surgical History   I have reviewed this patient's surgical history and updated it with pertinent information if needed.  Past Surgical History:   Procedure Laterality Date     BYPASS GRAFT ARTERY CORONARY       CYSTOURETEROSCOPY, WITH LITHOTRIPSY USING ANIYA 120P LASER AND URETERAL STENT INSERTION Bilateral 1/3/2023    Procedure: CYSTOSCOPY, BILATERAL RETROGRADE PYELOGRAM, RIGHT URETEROSCOPY WITH LASER LITHOTRIPSY, BILATERAL URETERAL STENT PLACEMENT;  Surgeon: Ishan Serrano MD;  Location: [facility]      HERNIA REPAIR Left        Prior to Admission Medications   Prior to Admission Medications   Prescriptions Last Dose Informant Patient Reported? Taking?   HYDROcodone-acetaminophen (NORCO) 5-325 MG tablet Past Month  No Yes   Sig: Take 1-2 tablets by mouth every 4 hours as needed for moderate to severe pain   acetaminophen (TYLENOL) 500 MG tablet Unknown  Yes Yes   Sig: Take 500-1,000 mg by mouth every 6 hours as needed for mild pain   aspirin 81 MG EC tablet 1/20/2023  Yes Yes   Sig: Take 81 mg by mouth daily   atorvastatin (LIPITOR) 80 MG tablet 1/20/2023  Yes Yes   Sig: Take 80 mg by mouth daily   clopidogrel (PLAVIX) 75 MG tablet 1/20/2023  Yes Yes   Sig: Take 75 mg by mouth daily   metoprolol tartrate (LOPRESSOR) 50 MG tablet 1/20/2023 at x1  Yes Yes   Sig: Take 75 mg by mouth 2 times daily   nitroGLYcerin (NITROSTAT) 0.4 MG sublingual tablet   Yes Yes   Sig: Place 0.4 mg under the tongue every 5 minutes as needed for chest pain For chest pain place 1 tablet under the tongue every 5 minutes for 3 doses. If symptoms persist 5 minutes after 1st dose call 911.   sildenafil (VIAGRA) 100 MG tablet Unknown  Yes Yes   Sig: Take 100 mg by mouth as needed      Facility-Administered Medications: None     Allergies   No Known Allergies    Immunization History   Immunization History   Administered Date(s) Administered     TDAP Vaccine (Boostrix) 06/28/2011       Social History   I have reviewed this patient's social history and updated it with pertinent information if needed. Roberto SHEPHERD Jose Ramon  reports that he has been smoking. He does not have any smokeless tobacco history on file. He reports that he does not drink alcohol and does not use drugs.    Family History   I have reviewed this patient's family history and updated it with pertinent information if needed.   No family history on file.    Review of Systems   The 10 point Review of Systems is negative other than HPI    Physical Exam   Temp: 98.5  F (36.9  C) Temp  src: Oral BP: 131/75 Pulse: 75   Resp: 18 SpO2: 97 % O2 Device: None (Room air)    Vital Signs with Ranges  Temp:  [97.6  F (36.4  C)-98.6  F (37  C)] 98.5  F (36.9  C)  Pulse:  [57-75] 75  Resp:  [18] 18  BP: (115-131)/(63-80) 131/75  SpO2:  [96 %-98 %] 97 %  237 lbs 0 oz  Body mass index is 32.14 kg/m .    GENERAL APPEARANCE:  awake  EYES: Eyes grossly normal to inspection  NECK: no adenopathy  RESP: lungs clear   CV: regular rates and rhythm  LYMPHATICS: normal ant/post cervical and supraclavicular nodes  ABDOMEN: soft, nontender  MS: extremities normal  SKIN: no suspicious lesions or rashes        Data   All laboratory data reviewed  Component      Latest Ref Rng & Units 1/23/2023   WBC      4.0 - 11.0 10e3/uL 11.5 (H)   RBC Count      4.40 - 5.90 10e6/uL 5.00   Hemoglobin      13.3 - 17.7 g/dL 13.6   Hematocrit      40.0 - 53.0 % 41.1   MCV      78 - 100 fL 82   MCH      26.5 - 33.0 pg 27.2   MCHC      31.5 - 36.5 g/dL 33.1   RDW      10.0 - 15.0 % 14.6   Platelet Count      150 - 450 10e3/uL 370     Component      Latest Ref Rng & Units 1/23/2023   Sodium      136 - 145 mmol/L 141   Potassium      3.4 - 5.3 mmol/L 3.7   Chloride      98 - 107 mmol/L 106   Carbon Dioxide (CO2)      22 - 29 mmol/L 21 (L)   Anion Gap      7 - 15 mmol/L 14   Urea Nitrogen      6.0 - 20.0 mg/dL 20.7 (H)   Creatinine      0.67 - 1.17 mg/dL 1.56 (H)   Calcium      8.6 - 10.0 mg/dL 8.6   Glucose      70 - 99 mg/dL 90     Microbiology  1/22 blood cx pending  1/20 blood cx 1/4 bottles positive  Peripheral Blood    0 Result Notes  Culture Positive on the 2nd day of incubation Abnormal        Staphylococcus epidermidis Panic     1 of 2 bottles        Resulting Agency: [facility]     Susceptibility     Staphylococcus epidermidis     INDU     Ciprofloxacin 1 ug/mL Susceptible     Clindamycin >=8 ug/mL Resistant     Erythromycin >=8 ug/mL Resistant     Gentamicin <=0.5 ug/mL Susceptible     Levofloxacin 0.5 ug/mL Susceptible     Oxacillin >=4 ug/mL  Resistant 1     Tetracycline <=1 ug/mL Susceptible     Vancomycin 2 ug/mL Susceptible                 1/20 urine cx       Peripheral Blood    0 Result Notes  Culture Positive on the 2nd day of incubation Abnormal        Staphylococcus epidermidis Panic     1 of 2 bottles        Resulting Agency: [facility]     Susceptibility     Staphylococcus epidermidis     INDU     Ciprofloxacin 1 ug/mL Susceptible     Clindamycin >=8 ug/mL Resistant     Erythromycin >=8 ug/mL Resistant     Gentamicin <=0.5 ug/mL Susceptible     Levofloxacin 0.5 ug/mL Susceptible     Oxacillin >=4 ug/mL Resistant 1     Tetracycline <=1 ug/mL Susceptible     Vancomycin 2 ug/mL Susceptible                 Imaging  1/20 CT chest PE abdomen  CT CHEST ABDOMEN PELVIS W/O CONTRAST 1/20/2023 2:44 PM     CLINICAL HISTORY: chills, vomiting, WBC 22, recent bilateral stents  and R lithotripsy, should have residual L stent; Cr too high for  contrast     TECHNIQUE: CT scan of the chest, abdomen, and pelvis was performed  without IV contrast. Multiplanar reformats were obtained. Dose  reduction techniques were used.   CONTRAST: None.     COMPARISON: 1/3/2023     FINDINGS:   LUNGS AND PLEURA: The lungs are clear. No pleural effusion. 3 mm left  lower lobe nodule (series 2, image 188) and 3 mm right upper lobe  nodule (series 3, image 86).     MEDIASTINUM/AXILLAE: 1.7 cm nodule in the thyroid isthmus. No  lymphadenopathy. No thoracic aortic aneurysm. CABG.     HEPATOBILIARY: Hepatic steatosis. Cholelithiasis.     PANCREAS: Normal.     SPLEEN: Normal.     ADRENAL GLANDS: Normal.     KIDNEYS/BLADDER:   Right: A few small residual right renal calculi measuring up to 5 mm.  Right ureteral stent in good position. Mild right  hydroureteronephrosis and perinephric stranding. No mass evident on  noncontrast CT     Left: An 18 mm staghorn calculus in the left renal pelvis and cluster  of nonobstructing calculi at the lower pole of the left kidney  measuring up to 11 mm. Moderate  left hydronephrosis with perinephric  stranding. No masses evident on noncontrast CT. No dilatation of the  left ureter.     Urinary bladder: Nearly decompressed. No calculi or surrounding  information.     BOWEL: Diverticulosis in the colon. No acute inflammatory change. No  obstruction. Normal appendix.     LYMPH NODES: Small retroperitoneal lymph nodes, the largest being a  1.3 cm left para-aortic lymph node, nonspecific and likely reactive.     VASCULATURE: No abdominal aortic aneurysm.     PELVIC ORGANS: No pelvic masses.     OTHER: No free fluid or fluid collections. Small fat-containing  umbilical hernia. No free air.     MUSCULOSKELETAL: Degenerative changes in the spine. No suspicious  lesions in the bones.                                                                      IMPRESSION:  1.  Obstructing 18 mm calculus in the LEFT renal pelvis with moderate  left hydronephrosis and perinephric stranding. Additional cluster of  nonobstructing calculi at the lower pole of the left kidney.  2.  Few tiny residual RIGHT renal calculi with a right ureteral stent  in good position. Mild right hydroureteronephrosis.  3.  No other acute findings in the chest, abdomen and pelvis.  4.  Tiny pulmonary nodules measuring up to 4 mm. See follow quadrants.  5.  Hepatic steatosis.  6.  Cholelithiasis.

## 2023-01-23 NOTE — PROGRESS NOTES
Patient's After Visit Summary was reviewed with patient and significant other.   Patient verbalized understanding of After Visit Summary, recommended follow up and was given an opportunity to ask questions.   Discharge medications sent home with patient/family: No, 2 ABX sent to [facility] in [place].   Discharged with significant other to home

## 2023-01-24 ENCOUNTER — VIRTUAL VISIT (OUTPATIENT)
Dept: UROLOGY | Facility: CLINIC | Age: 53
End: 2023-01-24
Payer: COMMERCIAL

## 2023-01-24 ENCOUNTER — PATIENT OUTREACH (OUTPATIENT)
Dept: CARE COORDINATION | Facility: CLINIC | Age: 53
End: 2023-01-24

## 2023-01-24 VITALS — HEIGHT: 72 IN | WEIGHT: 237 LBS | BODY MASS INDEX: 32.1 KG/M2

## 2023-01-24 DIAGNOSIS — A49.8 STAPHYLOCOCCUS EPIDERMIDIS INFECTION: ICD-10-CM

## 2023-01-24 DIAGNOSIS — N20.0 BILATERAL NEPHROLITHIASIS: Primary | ICD-10-CM

## 2023-01-24 LAB
BACTERIA BLD CULT: ABNORMAL
BACTERIA BLD CULT: ABNORMAL
BACTERIA UR CULT: ABNORMAL

## 2023-01-24 PROCEDURE — 99214 OFFICE O/P EST MOD 30 MIN: CPT | Mod: 95 | Performed by: STUDENT IN AN ORGANIZED HEALTH CARE EDUCATION/TRAINING PROGRAM

## 2023-01-24 RX ORDER — CLINDAMYCIN PHOSPHATE 900 MG/50ML
900 INJECTION, SOLUTION INTRAVENOUS SEE ADMIN INSTRUCTIONS
Status: CANCELLED | OUTPATIENT
Start: 2023-01-24

## 2023-01-24 RX ORDER — CLINDAMYCIN PHOSPHATE 900 MG/50ML
900 INJECTION, SOLUTION INTRAVENOUS
Status: CANCELLED | OUTPATIENT
Start: 2023-01-24

## 2023-01-24 ASSESSMENT — PAIN SCALES - GENERAL: PAINLEVEL: NO PAIN (0)

## 2023-01-24 NOTE — PROGRESS NOTES
"TEXT LINK TO CELL PHONE [phone]  Mike is a 52 year old who is being evaluated via a billable video visit.      How would you like to obtain your AVS? Mail a copy  If the video visit is dropped, the invitation should be resent by: Text to cell phone: [phone]  Will anyone else be joining your video visit? No      CHIEF COMPLAINT   Roberto Albright who is a 52 year old male returns today for follow-up of bilateral nephrolithiasis s/p right ureteroscopy 1/3/2023 and bilateral stent placement,, left stent removal 1/11/23, now s/p left ureteral stent placement 1/20/2023    HPI   Roberto Albright is a 52 year old male returns today for follow-up of bilateral nephrolithiasis s/p right ureteroscopy 1/3/2023 and bilateral stent placement, left stent removal 1/11/23, now s/p left ureteral stent placement 1/20/2023 with Staph. epi. UTI and bacteremia.    He is now discharged from the hospital on abx, plan to complete levaquin and then start on doxycyline. He is feeling well    Had initially planned for left PCNL with outside urologist but he wishes to transfer care    PHYSICAL EXAM  Patient is a 52 year old  male   Vitals: Height 1.829 m (6' 0.01\"), weight 107.5 kg (237 lb).  Body mass index is 32.14 kg/m .  General Appearance Adult:   Alert, no acute distress, oriented  HENT: throat/mouth:normal, good dentition  Lungs: no respiratory distress, or pursed lip breathing  Heart: No obvious jugular venous distension present  Musculoskeltal: extremities normal, no peripheral edema  Skin: no suspicious lesions or rashes  Neuro: Alert, oriented, speech and mentation normal  Psych: affect and mood normal    All pertinent imaging reviewed and interpreted personally:    Ct c/a/p 1/20/2023      21 mm left renal pelvis stone, lower pole left calyceal stones about 20 mm in long axis, no retrorenal colon      4 mm right upper pole renal stone    ASSESSMENT and PLAN  52 year old male returns today for follow-up of bilateral " nephrolithiasis s/p right ureteroscopy 1/3/2023 and bilateral stent placement, left stent removal 1/11/23, now s/p left ureteral stent placement 1/20/2023 with Staph. epi. UTI and bacteremia.    Discussed his residual stone burden. Small 4 mm right upper pole stone, large 21 mm left renal pelvis stone with additional left lower pole stones up to 20 mm.    Re: right renal stone, discussed observation vs. Repeat ureteroscopy. He elects for ureteroscopy. Risks including ureteral injury, infection, incomplete stone clearance, hematuria, stent pain and irritation discussed    Re: left renal stones, large burden. Discussed staged ureteroscopy vs. Percutaneous nephrolithotomy. He opts for left PCNL. Risks including ureteral injury, infection, incomplete stone clearance, need for secondary procedure, hematuria/bleeding/need for transfusion, injury to other organs including the colon or the lung/pleura, stent pain and irritation discussed. He understands the need for overnight observation in the hospital. Plan to lower pole access with IR assistance. Elective major surgery    - cysto, right URS, left PCNL, bilateral stent exchange, overnight stay  - cysto bilateral stent removal in the office about 1 week postop  - continue abx for staph epi UTI as planned      Silvano Devries MD   [facility] Urology  [facility] Phone: [phone]    Video-Visit Details    Type of service:  Video Visit     Originating Location (pt. Location): Home  Distant Location (provider location):  On-site  Platform used for Video Visit: [employer]estander

## 2023-01-24 NOTE — LETTER
"1/24/2023       RE: Roberto Albright  [address] [facility] [address]     Dear Colleague,    Thank you for referring your patient, Roberto Albright, to the [facility] [place] at [facility]. Please see a copy of my visit note below.    TEXT LINK TO CELL PHONE [phone]  Mike is a 52 year old who is being evaluated via a billable video visit.      How would you like to obtain your AVS? Mail a copy  If the video visit is dropped, the invitation should be resent by: Text to cell phone: [phone]  Will anyone else be joining your video visit? No      CHIEF COMPLAINT   Roberto Albright who is a 52 year old male returns today for follow-up of bilateral nephrolithiasis s/p right ureteroscopy 1/3/2023 and bilateral stent placement,, left stent removal 1/11/23, now s/p left ureteral stent placement 1/20/2023    HPI   Roberto Albright is a 52 year old male returns today for follow-up of bilateral nephrolithiasis s/p right ureteroscopy 1/3/2023 and bilateral stent placement, left stent removal 1/11/23, now s/p left ureteral stent placement 1/20/2023 with Staph. epi. UTI and bacteremia.    He is now discharged from the hospital on abx, plan to complete levaquin and then start on doxycyline. He is feeling well    Had initially planned for left PCNL with outside urologist but he wishes to transfer care    PHYSICAL EXAM  Patient is a 52 year old  male   Vitals: Height 1.829 m (6' 0.01\"), weight 107.5 kg (237 lb).  Body mass index is 32.14 kg/m .  General Appearance Adult:   Alert, no acute distress, oriented  HENT: throat/mouth:normal, good dentition  Lungs: no respiratory distress, or pursed lip breathing  Heart: No obvious jugular venous distension present  Musculoskeltal: extremities normal, no peripheral edema  Skin: no suspicious lesions or rashes  Neuro: Alert, oriented, speech and mentation normal  Psych: affect and mood normal    All " pertinent imaging reviewed and interpreted personally:    Ct c/a/p 1/20/2023      21 mm left renal pelvis stone, lower pole left calyceal stones about 20 mm in long axis, no retrorenal colon      4 mm right upper pole renal stone    ASSESSMENT and PLAN  52 year old male returns today for follow-up of bilateral nephrolithiasis s/p right ureteroscopy 1/3/2023 and bilateral stent placement, left stent removal 1/11/23, now s/p left ureteral stent placement 1/20/2023 with Staph. epi. UTI and bacteremia.    Discussed his residual stone burden. Small 4 mm right upper pole stone, large 21 mm left renal pelvis stone with additional left lower pole stones up to 20 mm.    Re: right renal stone, discussed observation vs. Repeat ureteroscopy. He elects for ureteroscopy. Risks including ureteral injury, infection, incomplete stone clearance, hematuria, stent pain and irritation discussed    Re: left renal stones, large burden. Discussed staged ureteroscopy vs. Percutaneous nephrolithotomy. He opts for left PCNL. Risks including ureteral injury, infection, incomplete stone clearance, need for secondary procedure, hematuria/bleeding/need for transfusion, injury to other organs including the colon or the lung/pleura, stent pain and irritation discussed. He understands the need for overnight observation in the hospital. Plan to lower pole access with IR assistance. Elective major surgery    - cysto, right URS, left PCNL, bilateral stent exchange, overnight stay  - cysto bilateral stent removal in the office about 1 week postop  - continue abx for staph epi UTI as planned      Silvano Devries MD   [facility] Urology  [facility] Phone: [phone]    Video-Visit Details    Type of service:  Video Visit     Originating Location (pt. Location): Home  Distant Location (provider location):  On-site  Platform used for Video Visit: [employer]yWell

## 2023-01-24 NOTE — PROGRESS NOTES
[facility]    Background: Transitional Care Management program identified per system criteria and reviewed by [facility] team for possible outreach.    Assessment: Upon chart review, [facility] Team member will not proceed with patient outreach related to this episode of Transitional Care Management program due to reason below:    Patient has a follow up appointment with an appropriate provider today for hospital discharge    Plan: Transitional Care Management episode addressed appropriately per reason noted above.      Odalys Storm RN  [facility] Resource [facility]    *Connected Care Resource Team does NOT follow patient ongoing. Referrals are identified based on internal discharge reports and the outreach is to ensure patient has an understanding of their discharge instructions.

## 2023-01-25 LAB — BACTERIA BLD CULT: NO GROWTH

## 2023-01-26 DIAGNOSIS — N20.0 KIDNEY STONE ON LEFT SIDE: Primary | ICD-10-CM

## 2023-01-27 LAB
BACTERIA BLD CULT: NO GROWTH
BACTERIA BLD CULT: NO GROWTH

## 2023-02-19 ENCOUNTER — ANESTHESIA EVENT (OUTPATIENT)
Dept: SURGERY | Facility: CLINIC | Age: 53
End: 2023-02-19
Payer: COMMERCIAL

## 2023-02-20 ENCOUNTER — APPOINTMENT (OUTPATIENT)
Dept: INTERVENTIONAL RADIOLOGY/VASCULAR | Facility: CLINIC | Age: 53
End: 2023-02-20
Attending: STUDENT IN AN ORGANIZED HEALTH CARE EDUCATION/TRAINING PROGRAM
Payer: COMMERCIAL

## 2023-02-20 ENCOUNTER — APPOINTMENT (OUTPATIENT)
Dept: GENERAL RADIOLOGY | Facility: CLINIC | Age: 53
End: 2023-02-20
Attending: STUDENT IN AN ORGANIZED HEALTH CARE EDUCATION/TRAINING PROGRAM
Payer: COMMERCIAL

## 2023-02-20 ENCOUNTER — ANESTHESIA (OUTPATIENT)
Dept: SURGERY | Facility: CLINIC | Age: 53
End: 2023-02-20
Payer: COMMERCIAL

## 2023-02-20 ENCOUNTER — HOSPITAL ENCOUNTER (OUTPATIENT)
Facility: CLINIC | Age: 53
Discharge: HOME | End: 2023-02-21
Attending: STUDENT IN AN ORGANIZED HEALTH CARE EDUCATION/TRAINING PROGRAM | Admitting: STUDENT IN AN ORGANIZED HEALTH CARE EDUCATION/TRAINING PROGRAM
Payer: COMMERCIAL

## 2023-02-20 DIAGNOSIS — N20.0 KIDNEY STONE ON LEFT SIDE: ICD-10-CM

## 2023-02-20 DIAGNOSIS — N20.0 BILATERAL NEPHROLITHIASIS: ICD-10-CM

## 2023-02-20 LAB
ALBUMIN SERPL BCG-MCNC: 3.9 G/DL (ref 3.5–5.2)
ALP SERPL-CCNC: 113 U/L (ref 40–129)
ALT SERPL W P-5'-P-CCNC: 92 U/L (ref 10–50)
ANION GAP SERPL CALCULATED.3IONS-SCNC: 13 MMOL/L (ref 7–15)
AST SERPL W P-5'-P-CCNC: 52 U/L (ref 10–50)
BILIRUB SERPL-MCNC: 0.5 MG/DL
BUN SERPL-MCNC: 17.7 MG/DL (ref 6–20)
CALCIUM SERPL-MCNC: 9.4 MG/DL (ref 8.6–10)
CHLORIDE SERPL-SCNC: 101 MMOL/L (ref 98–107)
CREAT SERPL-MCNC: 1.66 MG/DL (ref 0.67–1.17)
CREAT SERPL-MCNC: 1.66 MG/DL (ref 0.67–1.17)
DEPRECATED HCO3 PLAS-SCNC: 24 MMOL/L (ref 22–29)
GFR SERPL CREATININE-BSD FRML MDRD: 49 ML/MIN/1.73M2
GFR SERPL CREATININE-BSD FRML MDRD: 49 ML/MIN/1.73M2
GLUCOSE SERPL-MCNC: 100 MG/DL (ref 70–99)
HGB BLD-MCNC: 14.9 G/DL (ref 13.3–17.7)
POTASSIUM SERPL-SCNC: 5 MMOL/L (ref 3.4–5.3)
PROT SERPL-MCNC: 6.9 G/DL (ref 6.4–8.3)
SODIUM SERPL-SCNC: 138 MMOL/L (ref 136–145)

## 2023-02-20 PROCEDURE — C1887 CATHETER, GUIDING: HCPCS | Performed by: STUDENT IN AN ORGANIZED HEALTH CARE EDUCATION/TRAINING PROGRAM

## 2023-02-20 PROCEDURE — 258N000003 HC RX IP 258 OP 636: Performed by: STUDENT IN AN ORGANIZED HEALTH CARE EDUCATION/TRAINING PROGRAM

## 2023-02-20 PROCEDURE — 80053 COMPREHEN METABOLIC PANEL: CPT | Performed by: STUDENT IN AN ORGANIZED HEALTH CARE EDUCATION/TRAINING PROGRAM

## 2023-02-20 PROCEDURE — 258N000001 HC RX 258: Performed by: STUDENT IN AN ORGANIZED HEALTH CARE EDUCATION/TRAINING PROGRAM

## 2023-02-20 PROCEDURE — 52352 CYSTOURETERO W/STONE REMOVE: CPT | Mod: 59 | Performed by: STUDENT IN AN ORGANIZED HEALTH CARE EDUCATION/TRAINING PROGRAM

## 2023-02-20 PROCEDURE — 258N000003 HC RX IP 258 OP 636: Performed by: ANESTHESIOLOGY

## 2023-02-20 PROCEDURE — C1725 CATH, TRANSLUMIN NON-LASER: HCPCS | Performed by: STUDENT IN AN ORGANIZED HEALTH CARE EDUCATION/TRAINING PROGRAM

## 2023-02-20 PROCEDURE — 52332 CYSTOSCOPY AND TREATMENT: CPT | Mod: RT | Performed by: STUDENT IN AN ORGANIZED HEALTH CARE EDUCATION/TRAINING PROGRAM

## 2023-02-20 PROCEDURE — 250N000009 HC RX 250: Performed by: STUDENT IN AN ORGANIZED HEALTH CARE EDUCATION/TRAINING PROGRAM

## 2023-02-20 PROCEDURE — 250N000025 HC SEVOFLURANE, PER MIN: Performed by: STUDENT IN AN ORGANIZED HEALTH CARE EDUCATION/TRAINING PROGRAM

## 2023-02-20 PROCEDURE — 250N000011 HC RX IP 250 OP 636: Performed by: STUDENT IN AN ORGANIZED HEALTH CARE EDUCATION/TRAINING PROGRAM

## 2023-02-20 PROCEDURE — C1894 INTRO/SHEATH, NON-LASER: HCPCS | Performed by: STUDENT IN AN ORGANIZED HEALTH CARE EDUCATION/TRAINING PROGRAM

## 2023-02-20 PROCEDURE — 250N000009 HC RX 250: Performed by: ANESTHESIOLOGY

## 2023-02-20 PROCEDURE — 250N000013 HC RX MED GY IP 250 OP 250 PS 637: Performed by: STUDENT IN AN ORGANIZED HEALTH CARE EDUCATION/TRAINING PROGRAM

## 2023-02-20 PROCEDURE — C2617 STENT, NON-COR, TEM W/O DEL: HCPCS | Performed by: STUDENT IN AN ORGANIZED HEALTH CARE EDUCATION/TRAINING PROGRAM

## 2023-02-20 PROCEDURE — 999N000083 IR NEPHROLITHOTOMY

## 2023-02-20 PROCEDURE — 74420 UROGRAPHY RTRGR +-KUB: CPT | Mod: 26 | Performed by: STUDENT IN AN ORGANIZED HEALTH CARE EDUCATION/TRAINING PROGRAM

## 2023-02-20 PROCEDURE — C1758 CATHETER, URETERAL: HCPCS | Performed by: STUDENT IN AN ORGANIZED HEALTH CARE EDUCATION/TRAINING PROGRAM

## 2023-02-20 PROCEDURE — 370N000017 HC ANESTHESIA TECHNICAL FEE, PER MIN: Performed by: STUDENT IN AN ORGANIZED HEALTH CARE EDUCATION/TRAINING PROGRAM

## 2023-02-20 PROCEDURE — 36415 COLL VENOUS BLD VENIPUNCTURE: CPT | Performed by: STUDENT IN AN ORGANIZED HEALTH CARE EDUCATION/TRAINING PROGRAM

## 2023-02-20 PROCEDURE — 250N000011 HC RX IP 250 OP 636: Performed by: ANESTHESIOLOGY

## 2023-02-20 PROCEDURE — C1769 GUIDE WIRE: HCPCS | Performed by: STUDENT IN AN ORGANIZED HEALTH CARE EDUCATION/TRAINING PROGRAM

## 2023-02-20 PROCEDURE — C1726 CATH, BAL DIL, NON-VASCULAR: HCPCS | Performed by: STUDENT IN AN ORGANIZED HEALTH CARE EDUCATION/TRAINING PROGRAM

## 2023-02-20 PROCEDURE — 50081 PERQ NL/PL LITHOTRP CPLX>2CM: CPT | Mod: LT | Performed by: STUDENT IN AN ORGANIZED HEALTH CARE EDUCATION/TRAINING PROGRAM

## 2023-02-20 PROCEDURE — 360N000084 HC SURGERY LEVEL 4 W/ FLUORO, PER MIN: Performed by: STUDENT IN AN ORGANIZED HEALTH CARE EDUCATION/TRAINING PROGRAM

## 2023-02-20 PROCEDURE — 710N000009 HC RECOVERY PHASE 1, LEVEL 1, PER MIN: Performed by: STUDENT IN AN ORGANIZED HEALTH CARE EDUCATION/TRAINING PROGRAM

## 2023-02-20 PROCEDURE — 85018 HEMOGLOBIN: CPT | Performed by: STUDENT IN AN ORGANIZED HEALTH CARE EDUCATION/TRAINING PROGRAM

## 2023-02-20 PROCEDURE — 82565 ASSAY OF CREATININE: CPT | Performed by: STUDENT IN AN ORGANIZED HEALTH CARE EDUCATION/TRAINING PROGRAM

## 2023-02-20 PROCEDURE — 82365 CALCULUS SPECTROSCOPY: CPT | Performed by: STUDENT IN AN ORGANIZED HEALTH CARE EDUCATION/TRAINING PROGRAM

## 2023-02-20 PROCEDURE — 999N000181 XR SURGERY CARM FLUORO GREATER THAN 5 MIN W STILLS: Mod: TC

## 2023-02-20 PROCEDURE — 999N000141 HC STATISTIC PRE-PROCEDURE NURSING ASSESSMENT: Performed by: STUDENT IN AN ORGANIZED HEALTH CARE EDUCATION/TRAINING PROGRAM

## 2023-02-20 PROCEDURE — 272N000001 HC OR GENERAL SUPPLY STERILE: Performed by: STUDENT IN AN ORGANIZED HEALTH CARE EDUCATION/TRAINING PROGRAM

## 2023-02-20 DEVICE — URETERAL STENT
Type: IMPLANTABLE DEVICE | Site: URETER | Status: FUNCTIONAL
Brand: POLARIS™ ULTRA

## 2023-02-20 RX ORDER — HYDROMORPHONE HCL IN WATER/PF 6 MG/30 ML
0.2 PATIENT CONTROLLED ANALGESIA SYRINGE INTRAVENOUS EVERY 5 MIN PRN
Status: DISCONTINUED | OUTPATIENT
Start: 2023-02-20 | End: 2023-02-20 | Stop reason: HOSPADM

## 2023-02-20 RX ORDER — OXYCODONE HYDROCHLORIDE 5 MG/1
10 TABLET ORAL EVERY 4 HOURS PRN
Status: DISCONTINUED | OUTPATIENT
Start: 2023-02-20 | End: 2023-02-20 | Stop reason: HOSPADM

## 2023-02-20 RX ORDER — CLINDAMYCIN PHOSPHATE 900 MG/50ML
900 INJECTION, SOLUTION INTRAVENOUS
Status: COMPLETED | OUTPATIENT
Start: 2023-02-20 | End: 2023-02-20

## 2023-02-20 RX ORDER — DIPHENHYDRAMINE HCL 25 MG
25 CAPSULE ORAL EVERY 6 HOURS PRN
Status: DISCONTINUED | OUTPATIENT
Start: 2023-02-20 | End: 2023-02-21 | Stop reason: HOSPADM

## 2023-02-20 RX ORDER — HYDROMORPHONE HYDROCHLORIDE 1 MG/ML
INJECTION, SOLUTION INTRAMUSCULAR; INTRAVENOUS; SUBCUTANEOUS PRN
Status: DISCONTINUED | OUTPATIENT
Start: 2023-02-20 | End: 2023-02-20

## 2023-02-20 RX ORDER — ASPIRIN 81 MG/1
81 TABLET ORAL DAILY
COMMUNITY
Start: 2023-02-24

## 2023-02-20 RX ORDER — AMOXICILLIN 250 MG
2 CAPSULE ORAL 2 TIMES DAILY
Qty: 56 TABLET | Refills: 0 | Status: SHIPPED | OUTPATIENT
Start: 2023-02-20 | End: 2023-03-06

## 2023-02-20 RX ORDER — PROPOFOL 10 MG/ML
INJECTION, EMULSION INTRAVENOUS CONTINUOUS PRN
Status: DISCONTINUED | OUTPATIENT
Start: 2023-02-20 | End: 2023-02-20

## 2023-02-20 RX ORDER — TAMSULOSIN HYDROCHLORIDE 0.4 MG/1
0.4 CAPSULE ORAL DAILY
Status: DISCONTINUED | OUTPATIENT
Start: 2023-02-20 | End: 2023-02-21 | Stop reason: HOSPADM

## 2023-02-20 RX ORDER — ONDANSETRON 2 MG/ML
INJECTION INTRAMUSCULAR; INTRAVENOUS PRN
Status: DISCONTINUED | OUTPATIENT
Start: 2023-02-20 | End: 2023-02-20

## 2023-02-20 RX ORDER — SODIUM CHLORIDE, SODIUM LACTATE, POTASSIUM CHLORIDE, CALCIUM CHLORIDE 600; 310; 30; 20 MG/100ML; MG/100ML; MG/100ML; MG/100ML
INJECTION, SOLUTION INTRAVENOUS CONTINUOUS
Status: DISCONTINUED | OUTPATIENT
Start: 2023-02-20 | End: 2023-02-20 | Stop reason: HOSPADM

## 2023-02-20 RX ORDER — CLINDAMYCIN PHOSPHATE 900 MG/50ML
900 INJECTION, SOLUTION INTRAVENOUS SEE ADMIN INSTRUCTIONS
Status: DISCONTINUED | OUTPATIENT
Start: 2023-02-20 | End: 2023-02-20 | Stop reason: HOSPADM

## 2023-02-20 RX ORDER — LEVOFLOXACIN 750 MG/1
750 TABLET, FILM COATED ORAL DAILY
Qty: 5 TABLET | Refills: 0 | Status: SHIPPED | OUTPATIENT
Start: 2023-02-20 | End: 2023-02-25

## 2023-02-20 RX ORDER — OXYCODONE HYDROCHLORIDE 5 MG/1
10 TABLET ORAL EVERY 4 HOURS PRN
Status: DISCONTINUED | OUTPATIENT
Start: 2023-02-20 | End: 2023-02-21 | Stop reason: HOSPADM

## 2023-02-20 RX ORDER — PROPOFOL 10 MG/ML
INJECTION, EMULSION INTRAVENOUS PRN
Status: DISCONTINUED | OUTPATIENT
Start: 2023-02-20 | End: 2023-02-20

## 2023-02-20 RX ORDER — GLYCOPYRROLATE 0.2 MG/ML
INJECTION, SOLUTION INTRAMUSCULAR; INTRAVENOUS PRN
Status: DISCONTINUED | OUTPATIENT
Start: 2023-02-20 | End: 2023-02-20

## 2023-02-20 RX ORDER — PROCHLORPERAZINE MALEATE 10 MG
10 TABLET ORAL EVERY 6 HOURS PRN
Status: DISCONTINUED | OUTPATIENT
Start: 2023-02-20 | End: 2023-02-21 | Stop reason: HOSPADM

## 2023-02-20 RX ORDER — CALCIUM CARBONATE 500 MG/1
500 TABLET, CHEWABLE ORAL 4 TIMES DAILY PRN
Status: DISCONTINUED | OUTPATIENT
Start: 2023-02-20 | End: 2023-02-21 | Stop reason: HOSPADM

## 2023-02-20 RX ORDER — DEXAMETHASONE SODIUM PHOSPHATE 4 MG/ML
INJECTION, SOLUTION INTRA-ARTICULAR; INTRALESIONAL; INTRAMUSCULAR; INTRAVENOUS; SOFT TISSUE PRN
Status: DISCONTINUED | OUTPATIENT
Start: 2023-02-20 | End: 2023-02-20

## 2023-02-20 RX ORDER — ATORVASTATIN CALCIUM 40 MG/1
80 TABLET, FILM COATED ORAL DAILY
Status: DISCONTINUED | OUTPATIENT
Start: 2023-02-21 | End: 2023-02-21 | Stop reason: HOSPADM

## 2023-02-20 RX ORDER — ACETAMINOPHEN 325 MG/1
975 TABLET ORAL EVERY 8 HOURS
Status: DISCONTINUED | OUTPATIENT
Start: 2023-02-20 | End: 2023-02-21 | Stop reason: HOSPADM

## 2023-02-20 RX ORDER — ONDANSETRON 4 MG/1
4 TABLET, ORALLY DISINTEGRATING ORAL EVERY 30 MIN PRN
Status: DISCONTINUED | OUTPATIENT
Start: 2023-02-20 | End: 2023-02-20 | Stop reason: HOSPADM

## 2023-02-20 RX ORDER — FENTANYL CITRATE 0.05 MG/ML
50 INJECTION, SOLUTION INTRAMUSCULAR; INTRAVENOUS EVERY 5 MIN PRN
Status: DISCONTINUED | OUTPATIENT
Start: 2023-02-20 | End: 2023-02-20 | Stop reason: HOSPADM

## 2023-02-20 RX ORDER — HYDROMORPHONE HCL IN WATER/PF 6 MG/30 ML
0.2 PATIENT CONTROLLED ANALGESIA SYRINGE INTRAVENOUS
Status: DISCONTINUED | OUTPATIENT
Start: 2023-02-20 | End: 2023-02-21 | Stop reason: HOSPADM

## 2023-02-20 RX ORDER — FENTANYL CITRATE 50 UG/ML
INJECTION, SOLUTION INTRAMUSCULAR; INTRAVENOUS PRN
Status: DISCONTINUED | OUTPATIENT
Start: 2023-02-20 | End: 2023-02-20

## 2023-02-20 RX ORDER — NALOXONE HYDROCHLORIDE 0.4 MG/ML
0.4 INJECTION, SOLUTION INTRAMUSCULAR; INTRAVENOUS; SUBCUTANEOUS
Status: DISCONTINUED | OUTPATIENT
Start: 2023-02-20 | End: 2023-02-21 | Stop reason: HOSPADM

## 2023-02-20 RX ORDER — ACETAMINOPHEN 325 MG/1
650 TABLET ORAL EVERY 4 HOURS PRN
Status: DISCONTINUED | OUTPATIENT
Start: 2023-02-23 | End: 2023-02-21 | Stop reason: HOSPADM

## 2023-02-20 RX ORDER — ONDANSETRON 2 MG/ML
4 INJECTION INTRAMUSCULAR; INTRAVENOUS EVERY 30 MIN PRN
Status: DISCONTINUED | OUTPATIENT
Start: 2023-02-20 | End: 2023-02-20 | Stop reason: HOSPADM

## 2023-02-20 RX ORDER — NALOXONE HYDROCHLORIDE 0.4 MG/ML
0.2 INJECTION, SOLUTION INTRAMUSCULAR; INTRAVENOUS; SUBCUTANEOUS
Status: DISCONTINUED | OUTPATIENT
Start: 2023-02-20 | End: 2023-02-21 | Stop reason: HOSPADM

## 2023-02-20 RX ORDER — OXYCODONE HYDROCHLORIDE 5 MG/1
5 TABLET ORAL EVERY 4 HOURS PRN
Status: DISCONTINUED | OUTPATIENT
Start: 2023-02-20 | End: 2023-02-20 | Stop reason: HOSPADM

## 2023-02-20 RX ORDER — OXYCODONE HYDROCHLORIDE 5 MG/1
5 TABLET ORAL EVERY 6 HOURS PRN
Qty: 10 TABLET | Refills: 0 | Status: SHIPPED | OUTPATIENT
Start: 2023-02-20 | End: 2023-02-23

## 2023-02-20 RX ORDER — METOPROLOL TARTRATE 50 MG
50 TABLET ORAL EVERY EVENING
COMMUNITY

## 2023-02-20 RX ORDER — AMOXICILLIN 250 MG
1 CAPSULE ORAL 2 TIMES DAILY
Status: DISCONTINUED | OUTPATIENT
Start: 2023-02-20 | End: 2023-02-21 | Stop reason: HOSPADM

## 2023-02-20 RX ORDER — LIDOCAINE HYDROCHLORIDE 20 MG/ML
INJECTION, SOLUTION INFILTRATION; PERINEURAL PRN
Status: DISCONTINUED | OUTPATIENT
Start: 2023-02-20 | End: 2023-02-20

## 2023-02-20 RX ORDER — SODIUM CHLORIDE 9 MG/ML
INJECTION, SOLUTION INTRAVENOUS CONTINUOUS
Status: DISCONTINUED | OUTPATIENT
Start: 2023-02-20 | End: 2023-02-21

## 2023-02-20 RX ORDER — TAMSULOSIN HYDROCHLORIDE 0.4 MG/1
0.4 CAPSULE ORAL DAILY
Qty: 30 CAPSULE | Refills: 0 | Status: SHIPPED | OUTPATIENT
Start: 2023-02-20 | End: 2023-03-22

## 2023-02-20 RX ORDER — FENTANYL CITRATE 0.05 MG/ML
25 INJECTION, SOLUTION INTRAMUSCULAR; INTRAVENOUS EVERY 5 MIN PRN
Status: DISCONTINUED | OUTPATIENT
Start: 2023-02-20 | End: 2023-02-20 | Stop reason: HOSPADM

## 2023-02-20 RX ORDER — NEOSTIGMINE METHYLSULFATE 1 MG/ML
VIAL (ML) INJECTION PRN
Status: DISCONTINUED | OUTPATIENT
Start: 2023-02-20 | End: 2023-02-20

## 2023-02-20 RX ORDER — MAGNESIUM HYDROXIDE 1200 MG/15ML
LIQUID ORAL PRN
Status: DISCONTINUED | OUTPATIENT
Start: 2023-02-20 | End: 2023-02-20 | Stop reason: HOSPADM

## 2023-02-20 RX ORDER — POLYETHYLENE GLYCOL 3350 17 G/17G
17 POWDER, FOR SOLUTION ORAL DAILY
Status: DISCONTINUED | OUTPATIENT
Start: 2023-02-21 | End: 2023-02-21 | Stop reason: HOSPADM

## 2023-02-20 RX ORDER — METOPROLOL TARTRATE 50 MG
50 TABLET ORAL EVERY EVENING
Status: DISCONTINUED | OUTPATIENT
Start: 2023-02-21 | End: 2023-02-21 | Stop reason: HOSPADM

## 2023-02-20 RX ORDER — CLINDAMYCIN PHOSPHATE 900 MG/50ML
900 INJECTION, SOLUTION INTRAVENOUS EVERY 8 HOURS
Status: DISCONTINUED | OUTPATIENT
Start: 2023-02-20 | End: 2023-02-21 | Stop reason: HOSPADM

## 2023-02-20 RX ORDER — ONDANSETRON 4 MG/1
4 TABLET, ORALLY DISINTEGRATING ORAL EVERY 6 HOURS PRN
Status: DISCONTINUED | OUTPATIENT
Start: 2023-02-20 | End: 2023-02-21 | Stop reason: HOSPADM

## 2023-02-20 RX ORDER — HYDROMORPHONE HCL IN WATER/PF 6 MG/30 ML
0.4 PATIENT CONTROLLED ANALGESIA SYRINGE INTRAVENOUS EVERY 5 MIN PRN
Status: DISCONTINUED | OUTPATIENT
Start: 2023-02-20 | End: 2023-02-20 | Stop reason: HOSPADM

## 2023-02-20 RX ORDER — LIDOCAINE 40 MG/G
CREAM TOPICAL
Status: DISCONTINUED | OUTPATIENT
Start: 2023-02-20 | End: 2023-02-21 | Stop reason: HOSPADM

## 2023-02-20 RX ORDER — IBUPROFEN 600 MG/1
600 TABLET, FILM COATED ORAL EVERY 6 HOURS
Qty: 40 TABLET | Refills: 0 | Status: SHIPPED | OUTPATIENT
Start: 2023-02-20 | End: 2023-03-02

## 2023-02-20 RX ORDER — ONDANSETRON 2 MG/ML
4 INJECTION INTRAMUSCULAR; INTRAVENOUS EVERY 6 HOURS PRN
Status: DISCONTINUED | OUTPATIENT
Start: 2023-02-20 | End: 2023-02-21 | Stop reason: HOSPADM

## 2023-02-20 RX ORDER — EPHEDRINE SULFATE 50 MG/ML
INJECTION, SOLUTION INTRAMUSCULAR; INTRAVENOUS; SUBCUTANEOUS PRN
Status: DISCONTINUED | OUTPATIENT
Start: 2023-02-20 | End: 2023-02-20

## 2023-02-20 RX ORDER — DEXMEDETOMIDINE HYDROCHLORIDE 4 UG/ML
INJECTION, SOLUTION INTRAVENOUS PRN
Status: DISCONTINUED | OUTPATIENT
Start: 2023-02-20 | End: 2023-02-20

## 2023-02-20 RX ORDER — LIDOCAINE 40 MG/G
CREAM TOPICAL
Status: DISCONTINUED | OUTPATIENT
Start: 2023-02-20 | End: 2023-02-20 | Stop reason: HOSPADM

## 2023-02-20 RX ORDER — DIPHENHYDRAMINE HYDROCHLORIDE 50 MG/ML
25 INJECTION INTRAMUSCULAR; INTRAVENOUS EVERY 6 HOURS PRN
Status: DISCONTINUED | OUTPATIENT
Start: 2023-02-20 | End: 2023-02-21 | Stop reason: HOSPADM

## 2023-02-20 RX ORDER — OXYCODONE HYDROCHLORIDE 5 MG/1
5 TABLET ORAL EVERY 4 HOURS PRN
Status: DISCONTINUED | OUTPATIENT
Start: 2023-02-20 | End: 2023-02-21 | Stop reason: HOSPADM

## 2023-02-20 RX ORDER — CLOPIDOGREL BISULFATE 75 MG/1
75 TABLET ORAL DAILY
COMMUNITY
Start: 2023-02-27

## 2023-02-20 RX ORDER — HYDROMORPHONE HCL IN WATER/PF 6 MG/30 ML
0.4 PATIENT CONTROLLED ANALGESIA SYRINGE INTRAVENOUS
Status: DISCONTINUED | OUTPATIENT
Start: 2023-02-20 | End: 2023-02-21 | Stop reason: HOSPADM

## 2023-02-20 RX ADMIN — ACETAMINOPHEN 975 MG: 325 TABLET, FILM COATED ORAL at 16:41

## 2023-02-20 RX ADMIN — DEXAMETHASONE SODIUM PHOSPHATE 4 MG: 4 INJECTION, SOLUTION INTRA-ARTICULAR; INTRALESIONAL; INTRAMUSCULAR; INTRAVENOUS; SOFT TISSUE at 08:46

## 2023-02-20 RX ADMIN — LIDOCAINE HYDROCHLORIDE 100 MG: 20 INJECTION, SOLUTION INFILTRATION; PERINEURAL at 08:29

## 2023-02-20 RX ADMIN — PHENYLEPHRINE HYDROCHLORIDE 100 MCG: 10 INJECTION INTRAVENOUS at 08:46

## 2023-02-20 RX ADMIN — DEXMEDETOMIDINE HYDROCHLORIDE 4 MCG: 200 INJECTION INTRAVENOUS at 10:29

## 2023-02-20 RX ADMIN — DEXMEDETOMIDINE HYDROCHLORIDE 4 MCG: 200 INJECTION INTRAVENOUS at 09:46

## 2023-02-20 RX ADMIN — ROCURONIUM BROMIDE 10 MG: 50 INJECTION, SOLUTION INTRAVENOUS at 09:27

## 2023-02-20 RX ADMIN — GLYCOPYRROLATE 0.8 MG: 0.2 INJECTION, SOLUTION INTRAMUSCULAR; INTRAVENOUS at 11:14

## 2023-02-20 RX ADMIN — ROCURONIUM BROMIDE 10 MG: 50 INJECTION, SOLUTION INTRAVENOUS at 09:36

## 2023-02-20 RX ADMIN — Medication 5 MG: at 08:55

## 2023-02-20 RX ADMIN — Medication 5 MG: at 08:57

## 2023-02-20 RX ADMIN — ONDANSETRON 4 MG: 2 INJECTION INTRAMUSCULAR; INTRAVENOUS at 10:44

## 2023-02-20 RX ADMIN — GENTAMICIN SULFATE 440 MG: 40 INJECTION, SOLUTION INTRAMUSCULAR; INTRAVENOUS at 07:34

## 2023-02-20 RX ADMIN — SODIUM CHLORIDE: 9 INJECTION, SOLUTION INTRAVENOUS at 16:10

## 2023-02-20 RX ADMIN — CLINDAMYCIN PHOSPHATE 900 MG: 900 INJECTION, SOLUTION INTRAVENOUS at 07:03

## 2023-02-20 RX ADMIN — ROCURONIUM BROMIDE 10 MG: 50 INJECTION, SOLUTION INTRAVENOUS at 09:55

## 2023-02-20 RX ADMIN — TAMSULOSIN HYDROCHLORIDE 0.4 MG: 0.4 CAPSULE ORAL at 16:41

## 2023-02-20 RX ADMIN — ROCURONIUM BROMIDE 10 MG: 50 INJECTION, SOLUTION INTRAVENOUS at 08:56

## 2023-02-20 RX ADMIN — PROPOFOL 200 MG: 10 INJECTION, EMULSION INTRAVENOUS at 08:29

## 2023-02-20 RX ADMIN — SODIUM CHLORIDE, POTASSIUM CHLORIDE, SODIUM LACTATE AND CALCIUM CHLORIDE: 600; 310; 30; 20 INJECTION, SOLUTION INTRAVENOUS at 07:02

## 2023-02-20 RX ADMIN — FENTANYL CITRATE 50 MCG: 50 INJECTION, SOLUTION INTRAMUSCULAR; INTRAVENOUS at 08:29

## 2023-02-20 RX ADMIN — SENNOSIDES AND DOCUSATE SODIUM 1 TABLET: 50; 8.6 TABLET ORAL at 20:01

## 2023-02-20 RX ADMIN — DEXMEDETOMIDINE HYDROCHLORIDE 4 MCG: 200 INJECTION INTRAVENOUS at 09:55

## 2023-02-20 RX ADMIN — ACETAMINOPHEN 975 MG: 325 TABLET, FILM COATED ORAL at 23:38

## 2023-02-20 RX ADMIN — ROCURONIUM BROMIDE 50 MG: 50 INJECTION, SOLUTION INTRAVENOUS at 08:29

## 2023-02-20 RX ADMIN — PHENYLEPHRINE HYDROCHLORIDE 100 MCG: 10 INJECTION INTRAVENOUS at 08:55

## 2023-02-20 RX ADMIN — PROPOFOL 40 MCG/KG/MIN: 10 INJECTION, EMULSION INTRAVENOUS at 08:43

## 2023-02-20 RX ADMIN — MIDAZOLAM 2 MG: 1 INJECTION INTRAMUSCULAR; INTRAVENOUS at 08:26

## 2023-02-20 RX ADMIN — FENTANYL CITRATE 50 MCG: 50 INJECTION, SOLUTION INTRAMUSCULAR; INTRAVENOUS at 09:40

## 2023-02-20 RX ADMIN — HYDROMORPHONE HYDROCHLORIDE 0.5 MG: 1 INJECTION, SOLUTION INTRAMUSCULAR; INTRAVENOUS; SUBCUTANEOUS at 10:44

## 2023-02-20 RX ADMIN — DEXMEDETOMIDINE HYDROCHLORIDE 8 MCG: 200 INJECTION INTRAVENOUS at 09:34

## 2023-02-20 RX ADMIN — CLINDAMYCIN PHOSPHATE 900 MG: 900 INJECTION, SOLUTION INTRAVENOUS at 23:43

## 2023-02-20 RX ADMIN — CLINDAMYCIN PHOSPHATE 900 MG: 900 INJECTION, SOLUTION INTRAVENOUS at 16:47

## 2023-02-20 RX ADMIN — Medication 5 MG: at 10:16

## 2023-02-20 RX ADMIN — PHENYLEPHRINE HYDROCHLORIDE 0.3 MCG/KG/MIN: 10 INJECTION INTRAVENOUS at 08:43

## 2023-02-20 RX ADMIN — SODIUM CHLORIDE, POTASSIUM CHLORIDE, SODIUM LACTATE AND CALCIUM CHLORIDE: 600; 310; 30; 20 INJECTION, SOLUTION INTRAVENOUS at 12:30

## 2023-02-20 RX ADMIN — NEOSTIGMINE METHYLSULFATE 5 MG: 1 INJECTION, SOLUTION INTRAVENOUS at 11:14

## 2023-02-20 ASSESSMENT — ACTIVITIES OF DAILY LIVING (ADL)
ADLS_ACUITY_SCORE: 18
ADLS_ACUITY_SCORE: 18
DIFFICULTY_EATING/SWALLOWING: NO
ADLS_ACUITY_SCORE: 18
ADLS_ACUITY_SCORE: 18
WALKING_OR_CLIMBING_STAIRS_DIFFICULTY: NO
WEAR_GLASSES_OR_BLIND: NO
ADLS_ACUITY_SCORE: 18
CONCENTRATING,_REMEMBERING_OR_MAKING_DECISIONS_DIFFICULTY: NO
ADLS_ACUITY_SCORE: 18
DRESSING/BATHING_DIFFICULTY: NO
ADLS_ACUITY_SCORE: 18
ADLS_ACUITY_SCORE: 18
DOING_ERRANDS_INDEPENDENTLY_DIFFICULTY: NO
CHANGE_IN_FUNCTIONAL_STATUS_SINCE_ONSET_OF_CURRENT_ILLNESS/INJURY: NO
FALL_HISTORY_WITHIN_LAST_SIX_MONTHS: NO
TOILETING_ISSUES: NO
ADLS_ACUITY_SCORE: 18

## 2023-02-20 ASSESSMENT — ENCOUNTER SYMPTOMS
DYSRHYTHMIAS: 0
SEIZURES: 0

## 2023-02-20 ASSESSMENT — LIFESTYLE VARIABLES: TOBACCO_USE: 0

## 2023-02-20 NOTE — PROVIDER NOTIFICATION
February 20, 2023    Patient states he takes 50mg of metoprolol tartrate in the evening. TORB from Dr. Graf to resume 50mg metoprolol tomorrow evening 2/21/2023. Further clarified that gentamicin dose should be given x 1 post procedure. Gentamicin order placed for 2/21/2023 AM.    Pippa JamesD

## 2023-02-20 NOTE — ANESTHESIA PROCEDURE NOTES
Airway       Patient location during procedure: OR       Procedure Start/Stop Times: 2/20/2023 8:32 AM  Staff -        CRNA: Karishma Reddy APRN CRNA       Performed By: CRNA  Consent for Airway        Urgency: elective  Indications and Patient Condition       Indications for airway management: shelley-procedural       Induction type:intravenous       Mask difficulty assessment: 1 - vent by mask    Final Airway Details       Final airway type: endotracheal airway       Successful airway: ETT - single  Endotracheal Airway Details        ETT size (mm): 8.0       Cuffed: yes       Successful intubation technique: direct laryngoscopy       DL Blade Type: MAC 4       Grade View of Cords: 2       Adjucts: stylet       Position: Right       Measured from: gums/teeth       Secured at (cm): 23       Bite block used: Soft    Post intubation assessment        Placement verified by: capnometry, equal breath sounds and chest rise        Number of attempts at approach: 1       Number of other approaches attempted: 0       Secured with: silk tape       Ease of procedure: easy       Dentition: Unchanged    Medication(s) Administered   Medication Administration Time: 2/20/2023 8:32 AM

## 2023-02-20 NOTE — ANESTHESIA PREPROCEDURE EVALUATION
Anesthesia Pre-Procedure Evaluation    Patient: Roberto Albright   MRN: [id number] : 1970        Procedure : Procedure(s):  left percutaneous nephrolithotomy, left ureteroscopy, left retrograde pyelogram, left ureteral stent exchange  cystoscopy, right ureteroscopy with laser lithotripsy and stone basketing, right retrograde pyelogram, right ureteral stent exchange          Past Medical History:   Diagnosis Date     CAD (coronary artery disease)      Calculus of kidney      Hyperlipidemia      Hypertension      Obese       Past Surgical History:   Procedure Laterality Date     BYPASS GRAFT ARTERY CORONARY       CYSTOSCOPY, RETROGRADES, INSERT STENT URETER(S), COMBINED Left 2023    Procedure: Cystoscopy, left retrograde pyelogram and left ureteral stent placement, fluoroscopic interpretation <1 hour physician time;  Surgeon: Silvano Devries MD;  Location: [facility] OR     CYSTOURETEROSCOPY, WITH LITHOTRIPSY USING ANIYA 120P LASER AND URETERAL STENT INSERTION Bilateral 1/3/2023    Procedure: CYSTOSCOPY, BILATERAL RETROGRADE PYELOGRAM, RIGHT URETEROSCOPY WITH LASER LITHOTRIPSY, BILATERAL URETERAL STENT PLACEMENT;  Surgeon: Ishan Serrano MD;  Location: [facility]     HERNIA REPAIR Left       No Known Allergies   Social History     Tobacco Use     Smoking status: Every Day     Smokeless tobacco: Never   Substance Use Topics     Alcohol use: No      Wt Readings from Last 1 Encounters:   23 107.5 kg (237 lb)        Prior to Admission medications    Medication Sig Start Date End Date Taking? Authorizing Provider   acetaminophen (TYLENOL) 500 MG tablet Take 500-1,000 mg by mouth every 6 hours as needed for mild pain    Reported, Patient   aspirin 81 MG EC tablet Take 81 mg by mouth daily    Unknown, Entered By History   atorvastatin (LIPITOR) 80 MG tablet Take 80 mg by mouth daily    Reported, Patient   clopidogrel (PLAVIX) 75 MG tablet Take 75 mg by mouth daily    Reported, Patient   doxycycline  hyclate (VIBRAMYCIN) 100 MG capsule Take 1 capsule (100 mg) by mouth 2 times daily for 14 days 2/6/23 2/20/23  Olivia Munoz MD   HYDROcodone-acetaminophen (NORCO) 5-325 MG tablet Take 1-2 tablets by mouth every 4 hours as needed for moderate to severe pain 1/3/23   Ishan Serrano MD   metoprolol tartrate (LOPRESSOR) 50 MG tablet Take 75 mg by mouth 2 times daily    Reported, Patient   nitroGLYcerin (NITROSTAT) 0.4 MG sublingual tablet Place 0.4 mg under the tongue every 5 minutes as needed for chest pain For chest pain place 1 tablet under the tongue every 5 minutes for 3 doses. If symptoms persist 5 minutes after 1st dose call 911.    Unknown, Entered By History   sildenafil (VIAGRA) 100 MG tablet Take 100 mg by mouth as needed 3/29/21   Reported, Patient     ECG 1/2/23: NSR     ECHO 1/11/21: Normal EF 50-55%.      Anesthesia Evaluation   Pt has had prior anesthetic. Type: General.    No history of anesthetic complications       ROS/MED HX  ENT/Pulmonary:    (-) tobacco use, asthma and sleep apnea   Neurologic:    (-) no seizures, no CVA and migraines   Cardiovascular:     (+) Dyslipidemia hypertension--CAD -CABG-date: 2017. stent-2021. 2 Bare Metal Stent.  (-) GROVES, arrhythmias and valvular problems/murmurs   METS/Exercise Tolerance: >4 METS    Hematologic:    (-) history of blood clots and anemia   Musculoskeletal:    (-) arthritis   GI/Hepatic:    (-) GERD and liver disease   Renal/Genitourinary:     (+) renal disease, type: CRI,     Endo:     (+) Obesity,  (-) Type I DM, Type II DM and thyroid disease   Psychiatric/Substance Use:    (-) psychiatric history   Infectious Disease:    (-) Recent Fever   Malignancy:       Other:            Physical Exam    Airway        Mallampati: III   TM distance: > 3 FB   Neck ROM: full   Mouth opening: > 3 cm    Respiratory Devices and Support         Dental       (+) Modest Abnormalities - crowns, retainers, 1 or 2 missing teeth      Cardiovascular   cardiovascular exam  normal          Pulmonary           breath sounds clear to auscultation           OUTSIDE LABS:  CBC:   Lab Results   Component Value Date    WBC 11.5 (H) 01/23/2023    WBC 11.5 (H) 01/22/2023    HGB 13.6 01/23/2023    HGB 13.8 01/22/2023    HCT 41.1 01/23/2023    HCT 41.1 01/22/2023     01/23/2023     01/22/2023     BMP:   Lab Results   Component Value Date     01/23/2023     01/22/2023    POTASSIUM 3.7 01/23/2023    POTASSIUM 3.7 01/22/2023    CHLORIDE 106 01/23/2023    CHLORIDE 106 01/22/2023    CO2 21 (L) 01/23/2023    CO2 21 (L) 01/22/2023    BUN 20.7 (H) 01/23/2023    BUN 28.8 (H) 01/22/2023    CR 1.56 (H) 01/23/2023    CR 1.80 (H) 01/22/2023    GLC 90 01/23/2023    GLC 93 01/22/2023     COAGS: No results found for: PTT, INR, FIBR  POC: No results found for: BGM, HCG, HCGS  HEPATIC:   Lab Results   Component Value Date    ALBUMIN 3.0 (L) 01/23/2023    PROTTOTAL 6.4 01/23/2023     (H) 01/23/2023     (H) 01/23/2023    ALKPHOS 156 (H) 01/23/2023    BILITOTAL 1.0 01/23/2023     OTHER:   Lab Results   Component Value Date    LACT 1.1 01/20/2023    URIEL 8.6 01/23/2023    LIPASE 78 (H) 01/20/2023       Anesthesia Plan    ASA Status:  3   NPO Status:  NPO Appropriate    Anesthesia Type: General.     - Airway: ETT   Induction: Propofol.   Maintenance: Balanced.   Techniques and Equipment:     - Airway: Video-Laryngoscope         Consents    Anesthesia Plan(s) and associated risks, benefits, and realistic alternatives discussed. Questions answered and patient/representative(s) expressed understanding.    - Discussed:     - Discussed with:  Patient         Postoperative Care    Pain management: Multi-modal analgesia.   PONV prophylaxis: Ondansetron (or other 5HT-3), Dexamethasone or Solumedrol, Background Propofol Infusion     Comments:                Gertrude Ortiz MD

## 2023-02-20 NOTE — ANESTHESIA CARE TRANSFER NOTE
Patient: Roberto Albright    Procedure: Procedure(s):  left percutaneous nephrolithotomy, left ureteroscopy, left retrograde pyelogram, left ureteral stent exchange  cystoscopy, right ureteroscopy with stone basketing, right retrograde pyelogram, right ureteral stent exchange       Diagnosis: Bilateral nephrolithiasis [N20.0]  Diagnosis Additional Information: No value filed.    Anesthesia Type:   General     Note:    Oropharynx: oropharynx clear of all foreign objects and spontaneously breathing  Level of Consciousness: drowsy  Oxygen Supplementation: face mask  Level of Supplemental Oxygen (L/min / FiO2): 6  Independent Airway: airway patency satisfactory and stable  Dentition: dentition unchanged  Vital Signs Stable: post-procedure vital signs reviewed and stable  Report to RN Given: handoff report given  Patient transferred to: PACU    Handoff Report: Identifed the Patient, Identified the Reponsible Provider, Reviewed the pertinent medical history, Discussed the surgical course, Reviewed Intra-OP anesthesia mangement and issues during anesthesia, Set expectations for post-procedure period and Allowed opportunity for questions and acknowledgement of understanding      Vitals:  Vitals Value Taken Time   /63 02/20/23 1130   Temp     Pulse 73 02/20/23 1132   Resp 15 02/20/23 1132   SpO2 96 % 02/20/23 1132   Vitals shown include unvalidated device data.    Electronically Signed By: WALKER Oquendo CRNA  February 20, 2023  11:34 AM

## 2023-02-20 NOTE — OR NURSING
Report called to [facility] , Chimnay RODRIGUEZ.  Pt to room via cart with NA in escort.  All belongings sent with pt.  Family jerson notified of transfer.

## 2023-02-20 NOTE — INTERVAL H&P NOTE
I have reviewed the surgical (or preoperative) H&P that is linked to this encounter, and examined the patient. There are no significant changes    Clinical Conditions Present on Arrival:  Clinically Significant Risk Factors Present on Admission                # Hypoalbuminemia: Lowest albumin = 2.9 g/dL in the past 30 days , will monitor as appropriate    # Drug Induced Platelet Defect: home medication list includes an antiplatelet medication  # Obesity: Estimated body mass index is 31.27 kg/m  as calculated from the following:    Height as of this encounter: 1.829 m (6').    Weight as of this encounter: 104.6 kg (230 lb 9.6 oz).

## 2023-02-20 NOTE — BRIEF OP NOTE
[facility]    Brief Operative Note    Pre-operative diagnosis: Bilateral nephrolithiasis [N20.0]  Post-operative diagnosis Same as pre-operative diagnosis    Procedure: Procedure(s):  Cystoscopy  right ureteroscopy with stone basketing  right retrograde pyelogram  right ureteral stent exchange    left percutaneous nephrolithotomy >2.5 cm stone burden  left ureteroscopy with stone basketing  left retrograde pyelogram  left ureteral stent exchange    Fluoroscopic interpretation <1 hour physician time      Surgeon: Surgeon(s) and Role:     * Silvano Devries MD - Primary     * Weston Graf MD - Resident - Assisting     * Florence Amezquita DO  Anesthesia: General   Estimated Blood Loss: Less than 20 ml    Drains: Bilateral 6 Fr x 28 cm stents  Herrera catheter    Specimens:   ID Type Source Tests Collected by Time Destination   A : RIGHT KIDNEY STONES Calculus/Stone Kidney, Right STONE ANALYSIS Silvano Devries MD 2/20/2023  9:31 AM    B : LEFT KIDNEY STONES Calculus/Stone Kidney, Left STONE ANALYSIS Silvano Devries MD 2/20/2023 10:54 AM      Findings:   4 mm right upper pole stone, basketed out     Numerous left kidney stones including dominant 2.1 cm stone and multiple 3-5 mm fragments, total stone burden >2.5 cm, 99% stone free on left side    Complications: None.  Implants:   Implant Name Type Inv. Item Serial No.  Lot No. LRB No. Used Action   STENT URETERAL PERCUFLEX PLUS [id number] - [id number] Stent STENT URETERAL PERCUFLEX PLUS [id number]  [employer] [id number] Right 1 Explanted   STENT URETERAL POLARIS ULTRA [id number] - [id number] Stent STENT URETERAL POLARIS ULTRA [id number]  [employer] [id number] Left 1 Implanted   STENT URETERAL PERCUFLEX PLUS [id number] - [id number] Stent STENT URETERAL PERCUFLEX PLUS [id number]  [employer] [id number] Left 1 Explanted   STENT  URETERAL POLARIS ULTRA [id number] - [id number] Stent STENT URETERAL POLARIS ULTRA [id number]  [employer] [id number] Right 1 Implanted

## 2023-02-20 NOTE — ANESTHESIA POSTPROCEDURE EVALUATION
Patient: Roberto Albright    Procedure: Procedure(s):  left percutaneous nephrolithotomy, left ureteroscopy, left retrograde pyelogram, left ureteral stent exchange  cystoscopy, right ureteroscopy with stone basketing, right retrograde pyelogram, right ureteral stent exchange       Anesthesia Type:  General    Note:  Disposition: Admission   Postop Pain Control: Uneventful            Sign Out: Well controlled pain   PONV: No   Neuro/Psych: Uneventful            Sign Out: Acceptable/Baseline neuro status   Airway/Respiratory: Uneventful            Sign Out: Acceptable/Baseline resp. status   CV/Hemodynamics: Uneventful            Sign Out: Acceptable CV status; No obvious hypovolemia; No obvious fluid overload   Other NRE: NONE   DID A NON-ROUTINE EVENT OCCUR? No           Last vitals:  Vitals Value Taken Time   /68 02/20/23 1330   Temp 36.5  C (97.7  F) 02/20/23 1230   Pulse 62 02/20/23 1333   Resp 15 02/20/23 1333   SpO2 92 % 02/20/23 1333   Vitals shown include unvalidated device data.    Electronically Signed By: Gertrude Ortiz MD  February 20, 2023  1:35 PM

## 2023-02-20 NOTE — OP NOTE
[facility]  Operative Note    Pre-operative diagnosis: Bilateral nephrolithiasis [N20.0]   Post-operative diagnosis Bilateral nephrolithiasis [N20.0]   Procedure: Cystoscopy  Right ureteroscopy with stone basketing  Right retrograde pyelogram  Right ureteral stent exchange  Left percutaneous nephrolithotomy >2.5 cm stone burden  Left ureteroscopy with stone basketing  Left retrograde pyelogram  Left ureteral stent exchange  Fluoroscopic interpretation <1 hour physician time      Surgeon: Silvano Devries MD   Assistants(s): none   Anesthesia: General    Estimated blood loss: Less than 20 ml    Total IV fluids: (See anesthesia record)   Blood transfusion: No transfusion was given during surgery   Total urine output: Not measured   Drains: Bilateral ureteral stents  Herrera catheter   Specimens: ID Type Source Tests Collected by Time Destination   A : RIGHT KIDNEY STONES Calculus/Stone Kidney, Right STONE ANALYSIS Silvano Devries MD 2/20/2023  9:31 AM    B : LEFT KIDNEY STONES Calculus/Stone Kidney, Left STONE ANALYSIS Silvano Devries MD 2/20/2023 10:54 AM         Implants: Implant Name Type Inv. Item Serial No.  Lot No. LRB No. Used Action   STENT URETERAL PERCUFLEX PLUS [id number] - [id number] Stent STENT URETERAL PERCUFLEX PLUS [id number]  [employer] [id number] Right 1 Explanted   STENT URETERAL POLARIS ULTRA [id number] - [id number] Stent STENT URETERAL POLARIS ULTRA [id number]  [employer] [id number] Left 1 Implanted   STENT URETERAL PERCUFLEX PLUS [id number] - [id number] Stent STENT URETERAL PERCUFLEX PLUS [id number]  [employer] [id number] Left 1 Explanted   STENT URETERAL POLARIS ULTRA [id number] - [id number] Stent STENT URETERAL POLARIS ULTRA [id number]  [employer] [id number] Right 1 Implanted          Findings:   4 mm right upper pole stone, basketed  out  Numerous left kidney stones including dominant 2.1 cm stone and multiple 3-5 mm fragments, total stone burden >2.5 cm, 99% stone free on left side.   Complications: None.   Condition: Stable               Description of procedure:  52 year old male returns today for follow-up of bilateral nephrolithiasis s/p right ureteroscopy 1/3/2023 and bilateral stent placement, left stent removal 1/11/23, Staph. epi. UTI and bacteremia s/p left ureteral stent placement 1/20/2023, who returns today for definitive stone treatment with right ureteroscopy as well as left percutaneous nephrolithotomy. Risks including ureteral injury, infection, incomplete stone clearance, need for secondary procedure, hematuria/bleeding/need for transfusion, injury to other organs including the colon or the lung/pleura, stent pain and irritation discussed. He understands the need for overnight observation in the hospital. He has been on levaquin as an outpatient for the Staph. epi. UTI. Informed consent was obtained    The patient was brought to operating room #30.  General anesthesia was induced, he was placed in prone position on the prone split leg table with all pressure points padded, and he was prepped and draped in standard sterile fashion.  A timeout was performed.    An Olympus flexible cystoscope was passed through the urethra into the bladder.  Bilateral ureteral stents were noted emanating from their respective orifices.  The right ureteral stent was grasped with a stent grasper and brought out the urethral meatus.  The stent was cannulated with a sensor wire which was passed up to the renal pelvis under fluoroscopic guidance.  The stent was removed intact and discarded.  A dual-lumen catheter was passed over the wire and a gentle retrograde pyelogram was performed which showed mild right hydronephrosis.  A 0.035 inch stiff shaft Glidewire was then passed through the dual-lumen catheter up to the renal pelvis and the dual-lumen  catheter was backloaded off both wires.  The sensor wire was clipped to the drapes as a safety wire.  A [employer] Navigator 12/14 Croatian by 46 cm ureteral access sheath was then passed over the working wire up to the proximal ureter under fluoroscopic guidance and the obturator and wire were removed.  A [employer] digital flexible ureteroscope was then passed through the access sheath up to the renal pelvis.  Complete pyeloscopy revealed a single 4 mm upper pole stone which was basketed out using a [employer] Halo basket.  The stone broke up into multiple fragments.  The right renal collecting system appeared to be stone free at the end of the procedure aside from some Gabriel's plaques.  Pullout ureteroscopy revealed the ureter to be in good condition with no tears or lacerations.  The ureteral access sheath and the scope were removed.  Attention was turned to the left PCNL.     The cystoscope was passed through the urethra and the left stent was grasped and brought out the urethral meatus.  The stent was cannulated with a sensor wire which was passed up to the renal pelvis under fluoroscopic guidance and the stent was removed intact and discarded.  A  film showed a large shadowing radiodensity in the lower pole as well as additional smaller stone debris.  A dual-lumen catheter was used to perform a retrograde pyelogram showing moderate left hydronephrosis and a large filling defect from the stone.  A 0.035 inch stiff shaft Glidewire was then passed through the dual-lumen catheter up to the renal pelvis and the dual-lumen catheter was backloaded off of both wires.  The sensor wire was clipped to the drapes as a safety wire.  The Navigator ureteral access sheath was then passed over the working wire up to the proximal ureter and the obturator and wire were removed.  The flexible ureteroscope was then passed through the access sheath up to the renal pelvis.  Complete pyeloscopy again noted a single dominant  2.1 cm stone as well as numerous additional stone fragments within the lower pole, total burden >2.5 cm.  Dr. Amezquita from interventional radiology then came into the room and gained access to the lower pole calyx using a combination of fluoroscopic and ultrasound guidance.  Please see her dictation for further details.  Once she was able to pass a Glidewire through the access and down the ureter I grasped the wire with the basket using the ureteroscope and brought it down through the sheath and out the penis creating through and through access.  A tiger tail catheter was then used to exchange the Glidewire for an [employer] superstiff guidewire.  This super stiff wire was clipped to the drapes down below to maintain safety.  The ureteroscope was then passed up the ureteral access sheath and used to monitor balloon dilation of the tract over the super stiff wire using a [employer] 24 Slovenian Nephromax balloon.  Once the tract was dilated the sheath was passed over the balloon under direct vision and the balloon was deflated and removed.  The rigid nephroscope was then passed through the sheath.  The Olympus ShockPulse-SE lithotrite was then used to fragment and remove the stones.  Flexible nephroscopy was performed and no significant stone fragments were remaining within the kidney, only tiny dust particles.  Pullout ureteroscopy revealed several 2-3 mm stone fragments within the ureter which were basketed out.  The ureteroscope and access sheath were removed.  The [employer] Super Stiff through and through wire was exchanged for a stiff shaft Glidewire using a tiger tail catheter.  A stent was then attempted to be placed under fluoroscopic guidance on this side in a retrograde fashion over the remaining Sensor safety wire however the distal curl of the stent ended up floating into the distal ureter.  Therefore the stent was removed via the nephrostomy tract using a stent grasper.  The ureter was cannulated with a  sensor wire in an antegrade fashion and the sensor wire was seen to coil in the bladder.  A stent was then placed in an antegrade fashion with good coils noted proximally and distally on the left side.  The through and through wire and the nephrostomy sheath were removed.  Manual pressure was held in the tract for several minutes for hemostasis and a Prolene stitch was used to close the skin.  Attention was turned to the placement of the stent on the right side.  A dual-lumen catheter was used to perform a retrograde pyelogram over the sensor wire on the right side.  A stent was then placed in the usual fashion under cystoscopic and fluoroscopic guidance with good coils noted proximally and distally.  The wire was removed.  A Herrera catheter was placed and left to gravity drainage.  Patient was cleaned up, awoken from anesthesia and brought to PACU in stable condition.    Silvano Devries MD   [facility]  [phone] clinic phone

## 2023-02-20 NOTE — PROGRESS NOTES
PTA medications updated by Medication Scribe prior to surgery via phone call with patient (last doses completed by Nurse)     Medication history sources: Patient and H&P  In the past week, patient estimated taking medication this percent of the time: Greater than 90%  Adherence assessment: N/A Not Observed    Significant changes made to the medication list:  Patient reports no longer taking the following meds (med scribe removed from PTA med list): Norco, Doxycycline      Additional medication history information:   None    Medication reconciliation completed by provider prior to medication history? No    Time spent in this activity: 30 minutes    The information provided in this note is only as accurate as the sources available at the time of update(s)    Prior to Admission medications    Medication Sig Last Dose Taking? Auth Provider Long Term End Date   acetaminophen (TYLENOL) 500 MG tablet Take 500-1,000 mg by mouth every 6 hours as needed for mild pain Unknown at prn Yes Reported, Patient     aspirin 81 MG EC tablet Take 81 mg by mouth daily 2/19/2023 at am Yes Unknown, Entered By History     atorvastatin (LIPITOR) 80 MG tablet Take 80 mg by mouth daily 2/20/2023 at am Yes Reported, Patient Yes    clopidogrel (PLAVIX) 75 MG tablet Take 75 mg by mouth daily 2/11/2023 at am Yes Reported, Patient Yes    metoprolol tartrate (LOPRESSOR) 50 MG tablet Take 75 mg by mouth every morning (1.5 x 50 mg = 75 mg) 2/20/2023 at am Yes Reported, Patient Yes    nitroGLYcerin (NITROSTAT) 0.4 MG sublingual tablet Place 0.4 mg under the tongue every 5 minutes as needed for chest pain For chest pain place 1 tablet under the tongue every 5 minutes for 3 doses. If symptoms persist 5 minutes after 1st dose call 911. Unknown at prn Yes Unknown, Entered By History Yes    sildenafil (VIAGRA) 100 MG tablet Take 100 mg by mouth as needed Unknown at prn Yes Reported, Patient Yes      Medication history completed by:    Juan Jose Mares CPhT   Medication [facility]

## 2023-02-21 VITALS
RESPIRATION RATE: 18 BRPM | BODY MASS INDEX: 31.23 KG/M2 | DIASTOLIC BLOOD PRESSURE: 65 MMHG | OXYGEN SATURATION: 95 % | SYSTOLIC BLOOD PRESSURE: 123 MMHG | TEMPERATURE: 98.2 F | HEART RATE: 68 BPM | WEIGHT: 230.6 LBS | HEIGHT: 72 IN

## 2023-02-21 LAB
ANION GAP SERPL CALCULATED.3IONS-SCNC: 11 MMOL/L (ref 7–15)
BUN SERPL-MCNC: 15.7 MG/DL (ref 6–20)
CALCIUM SERPL-MCNC: 9 MG/DL (ref 8.6–10)
CHLORIDE SERPL-SCNC: 101 MMOL/L (ref 98–107)
CREAT SERPL-MCNC: 1.4 MG/DL (ref 0.67–1.17)
DEPRECATED HCO3 PLAS-SCNC: 26 MMOL/L (ref 22–29)
ERYTHROCYTE [DISTWIDTH] IN BLOOD BY AUTOMATED COUNT: 15.5 % (ref 10–15)
FASTING STATUS PATIENT QL REPORTED: NO
GFR SERPL CREATININE-BSD FRML MDRD: 60 ML/MIN/1.73M2
GLUCOSE SERPL-MCNC: 97 MG/DL (ref 70–99)
GLUCOSE SERPL-MCNC: 97 MG/DL (ref 70–99)
HCT VFR BLD AUTO: 40.6 % (ref 40–53)
HGB BLD-MCNC: 13.6 G/DL (ref 13.3–17.7)
MCH RBC QN AUTO: 27.3 PG (ref 26.5–33)
MCHC RBC AUTO-ENTMCNC: 33.5 G/DL (ref 31.5–36.5)
MCV RBC AUTO: 82 FL (ref 78–100)
PLATELET # BLD AUTO: 197 10E3/UL (ref 150–450)
POTASSIUM SERPL-SCNC: 3.9 MMOL/L (ref 3.4–5.3)
RBC # BLD AUTO: 4.98 10E6/UL (ref 4.4–5.9)
SODIUM SERPL-SCNC: 138 MMOL/L (ref 136–145)
WBC # BLD AUTO: 14.2 10E3/UL (ref 4–11)

## 2023-02-21 PROCEDURE — 85048 AUTOMATED LEUKOCYTE COUNT: CPT | Performed by: STUDENT IN AN ORGANIZED HEALTH CARE EDUCATION/TRAINING PROGRAM

## 2023-02-21 PROCEDURE — 258N000003 HC RX IP 258 OP 636: Performed by: STUDENT IN AN ORGANIZED HEALTH CARE EDUCATION/TRAINING PROGRAM

## 2023-02-21 PROCEDURE — 80048 BASIC METABOLIC PNL TOTAL CA: CPT | Performed by: STUDENT IN AN ORGANIZED HEALTH CARE EDUCATION/TRAINING PROGRAM

## 2023-02-21 PROCEDURE — 250N000013 HC RX MED GY IP 250 OP 250 PS 637: Performed by: STUDENT IN AN ORGANIZED HEALTH CARE EDUCATION/TRAINING PROGRAM

## 2023-02-21 PROCEDURE — 36415 COLL VENOUS BLD VENIPUNCTURE: CPT | Performed by: STUDENT IN AN ORGANIZED HEALTH CARE EDUCATION/TRAINING PROGRAM

## 2023-02-21 PROCEDURE — 82947 ASSAY GLUCOSE BLOOD QUANT: CPT | Performed by: STUDENT IN AN ORGANIZED HEALTH CARE EDUCATION/TRAINING PROGRAM

## 2023-02-21 PROCEDURE — 250N000011 HC RX IP 250 OP 636: Performed by: STUDENT IN AN ORGANIZED HEALTH CARE EDUCATION/TRAINING PROGRAM

## 2023-02-21 RX ADMIN — SENNOSIDES AND DOCUSATE SODIUM 1 TABLET: 50; 8.6 TABLET ORAL at 08:46

## 2023-02-21 RX ADMIN — POLYETHYLENE GLYCOL 3350 17 G: 17 POWDER, FOR SOLUTION ORAL at 08:45

## 2023-02-21 RX ADMIN — CLINDAMYCIN PHOSPHATE 900 MG: 900 INJECTION, SOLUTION INTRAVENOUS at 08:51

## 2023-02-21 RX ADMIN — GENTAMICIN SULFATE 440 MG: 40 INJECTION, SOLUTION INTRAMUSCULAR; INTRAVENOUS at 06:48

## 2023-02-21 RX ADMIN — METOPROLOL TARTRATE 75 MG: 50 TABLET, FILM COATED ORAL at 08:45

## 2023-02-21 RX ADMIN — TAMSULOSIN HYDROCHLORIDE 0.4 MG: 0.4 CAPSULE ORAL at 08:46

## 2023-02-21 RX ADMIN — ACETAMINOPHEN 975 MG: 325 TABLET, FILM COATED ORAL at 08:45

## 2023-02-21 ASSESSMENT — ACTIVITIES OF DAILY LIVING (ADL)
ADLS_ACUITY_SCORE: 18

## 2023-02-21 NOTE — PROGRESS NOTES
Reason for Admission:  Bilateral nephrolithiasis, bilateral ureteral stent exchange  Cognitive/Mentation: A/Ox4  Neuros/CMS: Intact  VS: Stable.Latest VS /71 (BP Location: Left arm, Patient Position: Semi-Nielsen's)   Pulse 64   Temp 98.2  F (36.8  C) (Oral)   Resp 18   Ht 1.829 m (6')   Wt 104.6 kg (230 lb 9.6 oz)   SpO2 93%   BMI 31.27 kg/m      GI: BS active, + flatus,   : Herrera cath in place, good U/O  Pulmonary: LS clear.  Pain: Denies pain       Drains/Lines: PIV SL  Skin: incision in the back covered, other skin intact  Activity: SBA  Diet: Regular diet   Therapies recs: home   Discharge:  Possible discharging home this morning      End of shift summary: Patient stable throughout shift.Slept well overnight. No acute events reported

## 2023-02-21 NOTE — PLAN OF CARE
Pt up here from PACU at around 3pm. POD 0 of multiple urology procedures. A&OX4. VSS on Ra. Denies pain. PIV infusing NS. Incision dressing on L upper back is WDL. Herrera cath with pinkish/red output. Denies N/T. BS hypo. Tolerating regular diet. Creat 1.66. Discharge possible tomorrow. Continue to monitor.

## 2023-02-21 NOTE — PROGRESS NOTES
Urology  Progress Note  02/21/2023    - No acute events overnight  - Pain is well controlled  - Tolerating a regular diet  - Looking to discharge before the winter storm    Exam  /65 (BP Location: Left arm, Patient Position: Semi-Nielsen's, Cuff Size: Adult Regular)   Pulse 68   Temp 98.2  F (36.8  C) (Oral)   Resp 18   Ht 1.829 m (6')   Wt 104.6 kg (230 lb 9.6 oz)   SpO2 95%   BMI 31.27 kg/m    No acute distress  Unlabored breathing  Abdomen soft, nontender, nondistended.  Back with pressure dressing in place  Herrera with light watermelon urine in tubing    I/O's (last 24/since midnight):  UOP 3650    Labs  WBC 14.2  Hgb 13.6  Cr 1.4 (1.6 in PACU)    Assessment/Plan  52 year old y/o male POD#1 s/p L PCNL, R URS.    Neuro: Tylenol prn oxy for pain control  CV: HDS, no issues, PTA cetoprolol, statin  Pulm: incentive spirometry while awake  FEN/GI: Regular diet, no IVF, bowel regimen  Endo: No issues  : Herrera out this AM. Pressure dressing to remain for 48 hours. Nylon suture in percutaneous tract to be removed in clinic.  Heme/ID: Clindamycin while hospitalized, will discharge with PO Levaquin   Activity: Ad heide  PPx: SCDs, will discuss ASA/Plavix restart on Monday 2/27.  Dispo: Home today.    Will discuss with Dr. Devries.    Ceci Ward MD  PGY-5 Urology  Pager [phone]

## 2023-02-21 NOTE — PLAN OF CARE
Goal Outcome Evaluation:         Patient stable for discharge. Herrera catheter removed with adequate output in urinal upon removal. PIV removed. Printed and reviewed AVS with patient and patient's significant other. Addressed follow-up questions regarding medications with patient. Patient's significant other to provide transport home.

## 2023-02-22 LAB
APPEARANCE STONE: NORMAL
APPEARANCE STONE: NORMAL
COMPN STONE: NORMAL
COMPN STONE: NORMAL
SPECIMEN WT: 20 MG
SPECIMEN WT: 928 MG

## 2023-03-07 ENCOUNTER — OFFICE VISIT (OUTPATIENT)
Dept: UROLOGY | Facility: CLINIC | Age: 53
End: 2023-03-07
Payer: COMMERCIAL

## 2023-03-07 VITALS
WEIGHT: 227 LBS | HEIGHT: 72 IN | BODY MASS INDEX: 30.75 KG/M2 | SYSTOLIC BLOOD PRESSURE: 134 MMHG | DIASTOLIC BLOOD PRESSURE: 80 MMHG

## 2023-03-07 DIAGNOSIS — E83.59 CALCIUM OXALATE CALCULUS: ICD-10-CM

## 2023-03-07 DIAGNOSIS — Z79.2 PROPHYLACTIC ANTIBIOTIC: ICD-10-CM

## 2023-03-07 DIAGNOSIS — N20.0 BILATERAL NEPHROLITHIASIS: Primary | ICD-10-CM

## 2023-03-07 PROCEDURE — 52310 CYSTOSCOPY AND TREATMENT: CPT | Performed by: STUDENT IN AN ORGANIZED HEALTH CARE EDUCATION/TRAINING PROGRAM

## 2023-03-07 PROCEDURE — 99213 OFFICE O/P EST LOW 20 MIN: CPT | Mod: 25 | Performed by: STUDENT IN AN ORGANIZED HEALTH CARE EDUCATION/TRAINING PROGRAM

## 2023-03-07 RX ORDER — CIPROFLOXACIN 500 MG/1
500 TABLET, FILM COATED ORAL ONCE
Qty: 1 TABLET | Refills: 0 | Status: SHIPPED | OUTPATIENT
Start: 2023-03-07 | End: 2023-03-07

## 2023-03-07 RX ORDER — LIDOCAINE HYDROCHLORIDE 20 MG/ML
JELLY TOPICAL ONCE
Status: COMPLETED | OUTPATIENT
Start: 2023-03-07 | End: 2023-03-07

## 2023-03-07 RX ADMIN — LIDOCAINE HYDROCHLORIDE: 20 JELLY TOPICAL at 10:37

## 2023-03-07 ASSESSMENT — PAIN SCALES - GENERAL: PAINLEVEL: NO PAIN (0)

## 2023-03-07 NOTE — PATIENT INSTRUCTIONS
"AFTER YOUR CYSTOSCOPY AND STENT REMOVAL  ?  ?  You have just completed a cystoscopy, or \"cysto\", which allowed your physician to learn more about your bladder (or to remove a stent placed after surgery). We suggest that you continue to avoid caffeine, fruit juice, and alcohol for the next 24 hours, however, you are encouraged to return to your normal activities.  ?  ?  A few things that are considered normal after your cystoscopy:  ?  * small amount of bleeding (or spotting) that clears within the next 24 hours  ?  * slight burning sensation with urination  ?  * sensation of needing to void (urinate) more frequently  ?  * the feeling of \"air\" in your urine  ?  * mild discomfort that is relieved with Tylenol    * bladder spasms  ?  ?  ?  Please contact our office promptly if you:  ?  * develop a fever above 101 degrees  ?  * are unable to urinate  ?  * develop bright red blood that does not stop  ?  * experience severe pain or swelling  ?  ?  ?  And of course, please contact our office with any concerns or questions [phone].  ?    "

## 2023-03-07 NOTE — LETTER
Date:March 8, 2023      Provider requested that no letter be sent. Do not send.       [facility]

## 2023-03-07 NOTE — NURSING NOTE
Chief Complaint   Patient presents with     Kidney Stone Related     Stent removal     Prior to the start of the procedure and with procedural staff participation, I verbally confirmed the patient s identity using two indicators, relevant allergies, that the procedure was appropriate and matched the consent or emergent situation, and that the correct equipment/implants were available. Immediately prior to starting the procedure I conducted the Time Out with the procedural staff and re-confirmed the patient s name, procedure, and site/side. I have wiped the patient off with the povidone-Iodine solution, draped them, and used Lidocaine hydrochloride jelly. (The Joint Commission universal protocol was followed.)  Yes    Sedation (Moderate or Deep): None    5mL 2% lidocaine hydrochloride Urojet instilled into urethra.    NDC# [address]-6  Lot #: [id number]  Expiration Date:  8/274    Vero More, EMT

## 2023-03-07 NOTE — LETTER
3/7/2023       RE: Roberto Albright  [id number] [address] [facility] [id number]     Dear Colleague,    Thank you for referring your patient, Roberto Albright, to the [facility] [place] at [facility]. Please see a copy of my visit note below.    CHIEF COMPLAINT   Roberto Albright who is a 52 year old male returns today for follow-up of bilateral nephrolithiasis s/p right ureteroscopy 1/3/2023 and bilateral stent placement, left stent removal 1/11/23, Staph. epi. UTI and bacteremia s/p left ureteral stent placement 1/20/2023, left PCNL and right URS 2/20/2023    HPI   Roberto Albright is a 52 year old male returns today for follow-up of bilateral nephrolithiasis s/p right ureteroscopy 1/3/2023 and bilateral stent placement, left stent removal 1/11/23, Staph. epi. UTI and bacteremia s/p left ureteral stent placement 1/20/2023, left PCNL and right URS 2/20/2023    Doing well no specific issues after surgery    PHYSICAL EXAM  Patient is a 52 year old  male   Vitals: Blood pressure 134/80, height 1.829 m (6'), weight 103 kg (227 lb).  Body mass index is 30.79 kg/m .  General Appearance Adult:   Alert, no acute distress, oriented  HENT: throat/mouth:normal, good dentition  Lungs: no respiratory distress, or pursed lip breathing  Heart: No obvious jugular venous distension present  Abdomen: nondistended  Back: left flank stitch in place, removed without complication  Musculoskeltal: extremities normal, no peripheral edema  Skin: no suspicious lesions or rashes  Neuro: Alert, oriented, speech and mentation normal  Psych: affect and mood normal  Gait: Normal      PRE-PROCEDURE DIAGNOSIS: bilateral renal stone    POST-PROCEDURE DIAGNOSIS: bilateral renal stones    PROCEDURE: Cystoscopy with bilateral ureteral stent removal    DESCRIPTION OF PROCEDURE: After informed consent was obtained, the patient was brought to the procedure room where he was placed in the  supine position with all pressure points well padded.  The penis was prepped and draped in sterile fashion. A flexible cystoscope was introduced through a well-lubricated urethra. The left stent was visualized, grasped with a flexible grasper and removed intact and discarded. This entire procedure was repeated for the right side    The flexible cystoscope was removed and the findings were described to the patient.     ASSESSMENT AND PLAN: 52 year old male returns today for follow-up of bilateral nephrolithiasis s/p right ureteroscopy 1/3/2023 and bilateral stent placement, left stent removal 1/11/23, Staph. epi. UTI and bacteremia s/p left ureteral stent placement 1/20/2023, left PCNL and right URS 2/20/2023    Both stents and the flank stitch removed today    Discussed caox stone prevention diet, see details in after visit summary. Offered further workup for medical stone prevention    - return 2-3 months with renal ultrasound, litholink x2    Silvano Devries MD   [facility]  [phone] clinic phone          Again, thank you for allowing me to participate in the care of your patient.      Sincerely,    Silvano Devries MD

## 2023-03-07 NOTE — PROGRESS NOTES
CHIEF COMPLAINT   Roberto Albright who is a 52 year old male returns today for follow-up of bilateral nephrolithiasis s/p right ureteroscopy 1/3/2023 and bilateral stent placement, left stent removal 1/11/23, Staph. epi. UTI and bacteremia s/p left ureteral stent placement 1/20/2023, left PCNL and right URS 2/20/2023    HPI   Roberto Albright is a 52 year old male returns today for follow-up of bilateral nephrolithiasis s/p right ureteroscopy 1/3/2023 and bilateral stent placement, left stent removal 1/11/23, Staph. epi. UTI and bacteremia s/p left ureteral stent placement 1/20/2023, left PCNL and right URS 2/20/2023    Doing well no specific issues after surgery    PHYSICAL EXAM  Patient is a 52 year old  male   Vitals: Blood pressure 134/80, height 1.829 m (6'), weight 103 kg (227 lb).  Body mass index is 30.79 kg/m .  General Appearance Adult:   Alert, no acute distress, oriented  HENT: throat/mouth:normal, good dentition  Lungs: no respiratory distress, or pursed lip breathing  Heart: No obvious jugular venous distension present  Abdomen: nondistended  Back: left flank stitch in place, removed without complication  Musculoskeltal: extremities normal, no peripheral edema  Skin: no suspicious lesions or rashes  Neuro: Alert, oriented, speech and mentation normal  Psych: affect and mood normal  Gait: Normal      PRE-PROCEDURE DIAGNOSIS: bilateral renal stone    POST-PROCEDURE DIAGNOSIS: bilateral renal stones    PROCEDURE: Cystoscopy with bilateral ureteral stent removal    DESCRIPTION OF PROCEDURE: After informed consent was obtained, the patient was brought to the procedure room where he was placed in the supine position with all pressure points well padded.  The penis was prepped and draped in sterile fashion. A flexible cystoscope was introduced through a well-lubricated urethra. The left stent was visualized, grasped with a flexible grasper and removed intact and discarded. This entire procedure was repeated for  the right side    The flexible cystoscope was removed and the findings were described to the patient.     ASSESSMENT AND PLAN: 52 year old male returns today for follow-up of bilateral nephrolithiasis s/p right ureteroscopy 1/3/2023 and bilateral stent placement, left stent removal 1/11/23, Staph. epi. UTI and bacteremia s/p left ureteral stent placement 1/20/2023, left PCNL and right URS 2/20/2023    Both stents and the flank stitch removed today    Discussed caox stone prevention diet, see details in after visit summary. Offered further workup for medical stone prevention    - return 2-3 months with renal ultrasound, litholink x2    Silvano Devries MD   [facility]  [phone] clinic phone
